# Patient Record
Sex: MALE | Race: WHITE | NOT HISPANIC OR LATINO | Employment: FULL TIME | URBAN - NONMETROPOLITAN AREA
[De-identification: names, ages, dates, MRNs, and addresses within clinical notes are randomized per-mention and may not be internally consistent; named-entity substitution may affect disease eponyms.]

---

## 2018-10-09 ENCOUNTER — OFFICE VISIT (OUTPATIENT)
Dept: URGENT CARE | Facility: PHYSICIAN GROUP | Age: 38
End: 2018-10-09
Payer: COMMERCIAL

## 2018-10-09 VITALS
DIASTOLIC BLOOD PRESSURE: 76 MMHG | BODY MASS INDEX: 30.07 KG/M2 | TEMPERATURE: 98.3 F | HEART RATE: 101 BPM | HEIGHT: 68 IN | OXYGEN SATURATION: 96 % | RESPIRATION RATE: 16 BRPM | SYSTOLIC BLOOD PRESSURE: 138 MMHG | WEIGHT: 198.4 LBS

## 2018-10-09 DIAGNOSIS — R51.9 ACUTE NONINTRACTABLE HEADACHE, UNSPECIFIED HEADACHE TYPE: ICD-10-CM

## 2018-10-09 PROCEDURE — 99204 OFFICE O/P NEW MOD 45 MIN: CPT | Performed by: FAMILY MEDICINE

## 2018-10-09 RX ORDER — BUTALBITAL, ACETAMINOPHEN AND CAFFEINE 50; 325; 40 MG/1; MG/1; MG/1
1 TABLET ORAL EVERY 6 HOURS PRN
Qty: 20 TAB | Refills: 0 | Status: SHIPPED | OUTPATIENT
Start: 2018-10-09 | End: 2018-10-16

## 2018-10-09 RX ORDER — KETOROLAC TROMETHAMINE 30 MG/ML
60 INJECTION, SOLUTION INTRAMUSCULAR; INTRAVENOUS ONCE
Status: COMPLETED | OUTPATIENT
Start: 2018-10-09 | End: 2018-10-09

## 2018-10-09 RX ADMIN — KETOROLAC TROMETHAMINE 60 MG: 30 INJECTION, SOLUTION INTRAMUSCULAR; INTRAVENOUS at 14:19

## 2018-10-09 NOTE — LETTER
October 9, 2018         Patient: Eric Ahn   YOB: 1980   Date of Visit: 10/9/2018           To Whom it May Concern:    Eric Ahn was seen in my clinic on 10/9/2018.     Please excuse from work for 10/9 and 10/10/18 due to medical condition.    If you have any questions or concerns, please don't hesitate to call.        Sincerely,           Eliud Buckner M.D.  Electronically Signed

## 2018-10-09 NOTE — PROGRESS NOTES
Chief Complaint:    Chief Complaint   Patient presents with   • Migraine     x5 days       History of Present Illness:    This is a new problem. For 5 days, he has headache, located bilaterally, and can be entire head. Problem fluctuates in severity but is overall at least moderately bothersome. Pain can feel sharp. Tried Aleve, Tylenol, and previously rx'd Vicodin, all with temporary help. He does not typically get headaches. No photophobia or nausea. He has occl thought he saw something out of corner of his eye, but no other changes in vision. Mom has h/o migraines along with a multitude of other problems. Recently he had body aches and cough but these are all improving and not bothersome to him anymore. No nasal symptoms or sore throat.      Review of Systems:    Constitutional: Negative for fever, chills, and diaphoresis.   Eyes: See HPI. Negative photophobia, pain, redness, and discharge.  ENT: Negative for ear pain, ear discharge, hearing loss, tinnitus, nasal congestion, nosebleeds, and sore throat.    Respiratory: See HPI.  Cardiovascular: Negative for chest pain, palpitations, orthopnea, claudication, leg swelling, and PND.   Gastrointestinal: Negative for abdominal pain, nausea, vomiting, diarrhea, constipation, blood in stool, and melena.   Genitourinary: Negative for dysuria, urinary urgency, urinary frequency, hematuria, and flank pain.   Musculoskeletal: See HPI.  Skin: Negative for rash and itching.   Neurological: See HPI.  Endo: Negative for polydipsia.   Heme: Does not bruise/bleed easily.   Psychiatric/Behavioral: Negative for depression, suicidal ideas, hallucinations, memory loss and substance abuse. The patient is not nervous/anxious and does not have insomnia.        Past Medical History:    History reviewed. No pertinent past medical history.    Past Surgical History:    History reviewed. No pertinent surgical history.    Social History:    Social History     Social History   • Marital  "status: Single     Spouse name: N/A   • Number of children: N/A   • Years of education: N/A     Occupational History   • Not on file.     Social History Main Topics   • Smoking status: Never Smoker   • Smokeless tobacco: Current User     Types: Chew   • Alcohol use No   • Drug use: No   • Sexual activity: Not on file     Other Topics Concern   • Not on file     Social History Narrative   • No narrative on file     Family History:    Family History   Problem Relation Age of Onset   • Lung Disease Mother      Medications:    No current outpatient prescriptions on file prior to visit.     No current facility-administered medications on file prior to visit.      Allergies:    No Known Allergies      Vitals:    Vitals:    10/09/18 1353   BP: 138/76   BP Location: Right arm   Patient Position: Sitting   BP Cuff Size: Adult   Pulse: (!) 101   Resp: 16   Temp: 36.8 °C (98.3 °F)   SpO2: 96%   Weight: 90 kg (198 lb 6.4 oz)   Height: 1.727 m (5' 8\")       Physical Exam:    Constitutional: Vital signs reviewed. Appears well-developed and well-nourished. No acute distress.   Eyes: Sclera white, conjunctivae clear. PERRLA.  ENT: External ears normal. External auditory canals normal without discharge. TMs translucent and non-bulging. Hearing normal. Nasal mucosa pink. Lips/teeth are normal. Oral mucosa pink and moist. Posterior pharynx: WNL.  Neck: Neck supple.   Cardiovascular: Regular rate and rhythm. No murmur.  Pulmonary/Chest: Respirations non-labored. Clear to auscultation bilaterally.   Musculoskeletal: Normal gait. Normal range of motion. No muscular atrophy or weakness.  Neurological: Alert and oriented to person, place, and time. CN 2-12 intact. Muscle tone normal. Coordination normal. Light touch and sensation normal. Normal cerebellar exam.  Skin: No rashes or lesions. Warm, dry, normal turgor.  Psychiatric: Normal mood and affect. Behavior is normal. Judgment and thought content normal.       Assessment / Plan:    1. " Acute nonintractable headache, unspecified headache type  - ketorolac (TORADOL) injection 60 mg; 2 mL by Intramuscular route Once.  - acetaminophen/caffeine/butalbital 325-40-50 mg (FIORICET) -40 MG Tab; Take 1 Tab by mouth every 6 hours as needed for Headache for up to 7 days.  Dispense: 20 Tab; Refill: 0      Work note given - excuse for 10/9 and 10/10/18.     Discussed with him DDX, management options, and risks, benefits, and alternatives to treatment plan agreed upon.    Exam is benign, he looks well. Recommended to treat headache with medications and see if will resolve problem. He agrees.    Agreeable to medications given and prescribed.  report checked - last Rx was Hydrocodone-APAP 5-325 mg #5 on 2/14/17.    Discussed expected course of duration, time for improvement, and to seek follow-up in Emergency Room, urgent care, or with PCP if getting worse at any time or not improving within expected time frame.

## 2018-10-25 ENCOUNTER — HOSPITAL ENCOUNTER (OUTPATIENT)
Facility: MEDICAL CENTER | Age: 38
End: 2018-10-25
Attending: UROLOGY
Payer: COMMERCIAL

## 2018-10-25 ENCOUNTER — HOSPITAL ENCOUNTER (OUTPATIENT)
Dept: LAB | Facility: MEDICAL CENTER | Age: 38
End: 2018-10-25
Attending: UROLOGY
Payer: COMMERCIAL

## 2018-10-25 PROCEDURE — 89321 SEMEN ANAL SPERM DETECTION: CPT

## 2018-10-27 LAB
SPECIMEN VOL SMN: 1.7 ML
SPERM P VAS SMN QL MICRO: NORMAL

## 2019-01-17 ENCOUNTER — OFFICE VISIT (OUTPATIENT)
Dept: URGENT CARE | Facility: PHYSICIAN GROUP | Age: 39
End: 2019-01-17
Payer: COMMERCIAL

## 2019-01-17 VITALS
HEART RATE: 96 BPM | BODY MASS INDEX: 30.01 KG/M2 | RESPIRATION RATE: 16 BRPM | TEMPERATURE: 98.3 F | HEIGHT: 68 IN | DIASTOLIC BLOOD PRESSURE: 74 MMHG | WEIGHT: 198 LBS | OXYGEN SATURATION: 96 % | SYSTOLIC BLOOD PRESSURE: 116 MMHG

## 2019-01-17 DIAGNOSIS — H10.33 ACUTE CONJUNCTIVITIS OF BOTH EYES, UNSPECIFIED ACUTE CONJUNCTIVITIS TYPE: ICD-10-CM

## 2019-01-17 DIAGNOSIS — J06.9 URI WITH COUGH AND CONGESTION: ICD-10-CM

## 2019-01-17 PROCEDURE — 99214 OFFICE O/P EST MOD 30 MIN: CPT | Performed by: PHYSICIAN ASSISTANT

## 2019-01-17 RX ORDER — DOXYCYCLINE HYCLATE 100 MG
100 TABLET ORAL 2 TIMES DAILY
Qty: 20 TAB | Refills: 0 | Status: SHIPPED | OUTPATIENT
Start: 2019-01-17 | End: 2020-12-15

## 2019-01-17 RX ORDER — POLYMYXIN B SULFATE AND TRIMETHOPRIM 1; 10000 MG/ML; [USP'U]/ML
1 SOLUTION OPHTHALMIC EVERY 4 HOURS
Qty: 10 ML | Refills: 0 | Status: SHIPPED | OUTPATIENT
Start: 2019-01-17 | End: 2019-01-24

## 2019-01-17 NOTE — PROGRESS NOTES
Chief Complaint   Patient presents with   • Cough       HISTORY OF PRESENT ILLNESS: Patient is a 38 y.o. male who presents today for the following:    Cough x 2-3 weeks, white sputum production  + nasal congestion, red eyes/exudate, ST, ear pain  Denies SOB, fever, body aches, chills  OTC meds tried: mucinex - didn't help much; allergy meds     There are no active problems to display for this patient.      Allergies:Patient has no known allergies.    Current Outpatient Prescriptions Ordered in [facility]   Medication Sig Dispense Refill   • doxycycline (VIBRAMYCIN) 100 MG Tab Take 1 Tab by mouth 2 times a day. 20 Tab 0   • polymixin-trimethoprim (POLYTRIM) 77939-4.1 UNIT/ML-% Solution Place 1 Drop in both eyes every 4 hours for 7 days. 10 mL 0     No current Epic-ordered facility-administered medications on file.        No past medical history on file.    Social History   Substance Use Topics   • Smoking status: Never Smoker   • Smokeless tobacco: Current User     Types: Chew   • Alcohol use No       Family Status   Relation Status   • Mo Alive   • Fa Alive   • Bro Alive     Family History   Problem Relation Age of Onset   • Lung Disease Mother        Review of Systems:   Constitutional ROS: No unexpected change in weight, No weakness, No fatigue  Ear ROS: No drainage, No tinnitus or vertigo, No recent change in hearing  Mouth/Throat ROS: No teeth or gum problems, No bleeding gums, No tongue complaints  Neck ROS: No swollen glands, No significant pain in neck  Pulmonary ROS: No chronic cough, sputum, or hemoptysis, No dyspnea on exertion, No wheezing  Cardiovascular ROS: No diaphoresis, No edema, No palpitations  Gastrointestinal ROS: No change in bowel habits, No significant change in appetite, No nausea, vomiting, diarrhea, or constipation  Musculoskeletal/Extremities ROS: No peripheral edema, No pain, redness or swelling on the joints  Hematologic/Lymphatic ROS: No chills, No night sweats, No weight  "loss  Skin/Integumentary ROS: No edema, No evidence of rash, No itching      Exam:  Blood pressure 116/74, pulse 96, temperature 36.8 °C (98.3 °F), temperature source Temporal, resp. rate 16, height 1.727 m (5' 8\"), weight 89.8 kg (198 lb), SpO2 96 %.  General: Well developed, well nourished. No distress.  Eye: PERRL/EOMI; conjunctivae mildly injected bilaterally, lids normal.  ENMT: Lips without lesions, MMM. Oropharynx is clear. Bilateral TMs are within normal limits.  Pulmonary: Unlabored respiratory effort. Lungs clear to auscultation, no wheezes, no rhonchi.  Cardiovascular: Regular rate and rhythm without murmur.    Neurologic: Grossly nonfocal. No facial asymmetry noted.  Lymph: No cervical lymphadenopathy noted.  Skin: Warm, dry, good turgor. No rashes in visible areas.   Psych: Normal mood. Alert and oriented x3. Judgment and insight is normal.    Assessment/Plan:  Take all medication as directed. Discussed appropriate over-the-counter symptomatic medication, and when to return to clinic. Follow up for worsening or persistent symptoms.  1. URI with cough and congestion  doxycycline (VIBRAMYCIN) 100 MG Tab   2. Acute conjunctivitis of both eyes, unspecified acute conjunctivitis type  polymixin-trimethoprim (POLYTRIM) 59319-7.1 UNIT/ML-% Solution       "

## 2020-01-10 ENCOUNTER — OFFICE VISIT (OUTPATIENT)
Dept: URGENT CARE | Facility: PHYSICIAN GROUP | Age: 40
End: 2020-01-10
Payer: COMMERCIAL

## 2020-01-10 VITALS
TEMPERATURE: 97.7 F | WEIGHT: 214 LBS | DIASTOLIC BLOOD PRESSURE: 84 MMHG | BODY MASS INDEX: 32.43 KG/M2 | SYSTOLIC BLOOD PRESSURE: 118 MMHG | HEART RATE: 100 BPM | HEIGHT: 68 IN | RESPIRATION RATE: 14 BRPM | OXYGEN SATURATION: 95 %

## 2020-01-10 DIAGNOSIS — B34.9 ACUTE VIRAL SYNDROME: ICD-10-CM

## 2020-01-10 DIAGNOSIS — J02.0 STREP PHARYNGITIS: ICD-10-CM

## 2020-01-10 LAB
FLUAV+FLUBV AG SPEC QL IA: NEGATIVE
INT CON NEG: NEGATIVE
INT CON NEG: NEGATIVE
INT CON POS: POSITIVE
INT CON POS: POSITIVE
S PYO AG THROAT QL: POSITIVE

## 2020-01-10 PROCEDURE — 87804 INFLUENZA ASSAY W/OPTIC: CPT | Performed by: PHYSICIAN ASSISTANT

## 2020-01-10 PROCEDURE — 87880 STREP A ASSAY W/OPTIC: CPT | Performed by: PHYSICIAN ASSISTANT

## 2020-01-10 PROCEDURE — 99214 OFFICE O/P EST MOD 30 MIN: CPT | Performed by: PHYSICIAN ASSISTANT

## 2020-01-10 RX ORDER — AMOXICILLIN 500 MG/1
500 CAPSULE ORAL 2 TIMES DAILY
Qty: 20 CAP | Refills: 0 | Status: SHIPPED | OUTPATIENT
Start: 2020-01-10 | End: 2020-01-20

## 2020-01-10 ASSESSMENT — ENCOUNTER SYMPTOMS
SHORTNESS OF BREATH: 0
SPUTUM PRODUCTION: 0
SINUS PAIN: 0
CHILLS: 1
MYALGIAS: 1
SORE THROAT: 1
COUGH: 1
HEADACHES: 1
FEVER: 1

## 2020-01-10 NOTE — PROGRESS NOTES
"Subjective:   Eric Ahn  is a 39 y.o. male who presents for Pharyngitis    This is a new problem.  Patient presents to urgent care with 3-day history of fever, chills, generalized body aches, nasal congestion, sore throat and cough.  Patient reports sore throat is \"severe\" and is making it difficult for him to sleep because he will \"stop breathing\".  Patient has not been taking any medication for this problem.  Patient has had no known exposure to strep or flu.  Patient reports that he did receive influenza vaccine this season.    Pharyngitis    Associated symptoms include congestion, coughing and headaches. Pertinent negatives include no ear pain or shortness of breath.     Review of Systems   Constitutional: Positive for chills, fever and malaise/fatigue.   HENT: Positive for congestion and sore throat. Negative for ear pain and sinus pain.    Respiratory: Positive for cough. Negative for sputum production and shortness of breath.    Musculoskeletal: Positive for myalgias.   Neurological: Positive for headaches.   All other systems reviewed and are negative.    No Known Allergies  Reviewed past medical, surgical , social and family history.  Reviewed prescription and over-the-counter medications with patient and electronic health record today.     Objective:   /84 (BP Location: Right arm, Patient Position: Sitting, BP Cuff Size: Large adult)   Pulse 100   Temp 36.5 °C (97.7 °F) (Temporal)   Resp 14   Ht 1.727 m (5' 8\")   Wt 97.1 kg (214 lb)   SpO2 95%   BMI 32.54 kg/m²   Physical Exam  Vitals signs reviewed.   Constitutional:       Appearance: He is well-developed. He is not ill-appearing or toxic-appearing.   HENT:      Head: Normocephalic and atraumatic.      Right Ear: Tympanic membrane, ear canal and external ear normal.      Left Ear: Tympanic membrane, ear canal and external ear normal.      Nose: Mucosal edema and congestion present. No rhinorrhea.      Right Turbinates: Swollen.      " Left Turbinates: Swollen.      Right Sinus: No maxillary sinus tenderness or frontal sinus tenderness.      Left Sinus: No maxillary sinus tenderness or frontal sinus tenderness.      Mouth/Throat:      Lips: Pink.      Mouth: Mucous membranes are moist.      Pharynx: Uvula midline. Posterior oropharyngeal erythema present. No oropharyngeal exudate or uvula swelling.      Tonsils: Swellin on the right. 0 on the left.   Eyes:      Conjunctiva/sclera: Conjunctivae normal.      Pupils: Pupils are equal, round, and reactive to light.   Neck:      Musculoskeletal: Normal range of motion and neck supple.   Cardiovascular:      Rate and Rhythm: Normal rate and regular rhythm.      Heart sounds: Normal heart sounds. No murmur. No friction rub.   Pulmonary:      Effort: Pulmonary effort is normal. No respiratory distress.      Breath sounds: Normal breath sounds.   Abdominal:      General: Bowel sounds are normal.      Palpations: Abdomen is soft.      Tenderness: There is no tenderness.   Musculoskeletal: Normal range of motion.   Lymphadenopathy:      Head:      Right side of head: No submental, submandibular, tonsillar, preauricular or posterior auricular adenopathy.      Left side of head: No submental, submandibular, tonsillar, preauricular or posterior auricular adenopathy.      Cervical: No cervical adenopathy.      Upper Body:      Right upper body: No supraclavicular adenopathy.      Left upper body: No supraclavicular adenopathy.   Skin:     General: Skin is warm and dry.      Findings: No rash.   Neurological:      Mental Status: He is alert and oriented to person, place, and time.      Cranial Nerves: Cranial nerves are intact. No cranial nerve deficit.      Sensory: Sensation is intact. No sensory deficit.      Motor: Motor function is intact.      Coordination: Coordination is intact. Coordination normal.      Gait: Gait is intact.   Psychiatric:         Attention and Perception: Attention normal.          Mood and Affect: Mood normal.         Speech: Speech normal.         Behavior: Behavior normal.         Thought Content: Thought content normal.         Judgment: Judgment normal.           Assessment/Plan:   1. Strep pharyngitis  - POCT Rapid Strep A  - amoxicillin (AMOXIL) 500 MG Cap; Take 1 Cap by mouth 2 times a day for 10 days.  Dispense: 20 Cap; Refill: 0    2. Acute viral syndrome  - POCT Influenza A/B    Results for orders placed or performed in visit on 01/10/20   POCT Influenza A/B   Result Value Ref Range    Rapid Influenza A-B Negative     Internal Control Positive Positive     Internal Control Negative Negative    POCT Rapid Strep A   Result Value Ref Range    Rapid Strep Screen Positive     Internal Control Positive Positive     Internal Control Negative Negative        Patient be treated with amoxicillin as above.  Contagion reviewed.  Increase fluids, rest.  Patient may use salt water gargles, ice pops, cool fluids.    May use Tylenol or ibuprofen over-the-counter as needed for pain or fever not to exceed recommended daily dose.  Work note provided.  Nasal saline  Humidifer  OTC Flonase nasal spray PRN    Differential diagnosis, natural history, supportive care, and indications for immediate follow-up discussed.     Red flag warning symptoms and strict ER/follow-up precautions given.  The patient demonstrated a good understanding and agreed with the treatment plan.  Please note that this note was created using voice recognition speech to text software. Every effort has been made to correct obvious errors.  However, I expect there are errors of grammar and possibly context that were not discovered prior to finalizing the note  SHAYAN Mg PA-C

## 2020-01-10 NOTE — LETTER
January 10, 2020         Patient: Eric Ahn   YOB: 1980   Date of Visit: 1/10/2020           To Whom it May Concern:    Eric Ahn was seen in my clinic on 1/10/2020. He may return to work on 11/13/2020..    If you have any questions or concerns, please don't hesitate to call.        Sincerely,           Jennifer Mg P.A.-C.  Electronically Signed

## 2020-02-24 ENCOUNTER — OFFICE VISIT (OUTPATIENT)
Dept: URGENT CARE | Facility: PHYSICIAN GROUP | Age: 40
End: 2020-02-24
Payer: COMMERCIAL

## 2020-02-24 VITALS
RESPIRATION RATE: 16 BRPM | WEIGHT: 210 LBS | OXYGEN SATURATION: 96 % | TEMPERATURE: 98.4 F | HEART RATE: 90 BPM | SYSTOLIC BLOOD PRESSURE: 110 MMHG | DIASTOLIC BLOOD PRESSURE: 80 MMHG | BODY MASS INDEX: 31.83 KG/M2 | HEIGHT: 68 IN

## 2020-02-24 DIAGNOSIS — J02.9 SORE THROAT: ICD-10-CM

## 2020-02-24 DIAGNOSIS — J02.0 STREP PHARYNGITIS: Primary | ICD-10-CM

## 2020-02-24 LAB
INT CON NEG: NEGATIVE
INT CON POS: POSITIVE
S PYO AG THROAT QL: POSITIVE

## 2020-02-24 PROCEDURE — 87880 STREP A ASSAY W/OPTIC: CPT | Performed by: PHYSICIAN ASSISTANT

## 2020-02-24 PROCEDURE — 99214 OFFICE O/P EST MOD 30 MIN: CPT | Performed by: PHYSICIAN ASSISTANT

## 2020-02-24 RX ORDER — AMOXICILLIN 875 MG/1
875 TABLET, COATED ORAL 2 TIMES DAILY
Qty: 20 TAB | Refills: 0 | Status: SHIPPED | OUTPATIENT
Start: 2020-02-24 | End: 2020-03-05

## 2020-02-25 NOTE — PROGRESS NOTES
Chief Complaint   Patient presents with   • Pharyngitis       HISTORY OF PRESENT ILLNESS: Patient is a 40 y.o. male who presents today because he has a 3-day history of sore throat and swollen lymph nodes.  Denies any fevers, chills, nausea, vomiting or diarrhea.  Incidentally he was put on doxycycline about a month ago for strep and his symptoms did go away but they returned over the last 3 days.  He currently is not on any medication for his current symptoms.    There are no active problems to display for this patient.      Allergies:Patient has no known allergies.    Current Outpatient Medications Ordered in Epic   Medication Sig Dispense Refill   • amoxicillin (AMOXIL) 875 MG tablet Take 1 Tab by mouth 2 times a day for 10 days. 20 Tab 0   • doxycycline (VIBRAMYCIN) 100 MG Tab Take 1 Tab by mouth 2 times a day. 20 Tab 0     No current Epic-ordered facility-administered medications on file.        History reviewed. No pertinent past medical history.    Social History     Tobacco Use   • Smoking status: Never Smoker   • Smokeless tobacco: Current User     Types: Chew   Substance Use Topics   • Alcohol use: No   • Drug use: No       Family Status   Relation Name Status   • Mo  Alive   • Fa  Alive   • Bro  Alive     Family History   Problem Relation Age of Onset   • Lung Disease Mother        ROS:  Review of Systems   Constitutional: Negative for fever, chills, weight loss and malaise/fatigue.   HENT: Negative for ear pain, nosebleeds, congestion, positive for sore throat and neck pain.    Eyes: Negative for blurred vision.   Respiratory: Negative for cough, sputum production, shortness of breath and wheezing.    Cardiovascular: Negative for chest pain, palpitations, orthopnea and leg swelling.   Gastrointestinal: Negative for heartburn, nausea, vomiting and abdominal pain.   Genitourinary: Negative for dysuria, urgency and frequency.     Exam:  /80 (BP Location: Right arm, Patient Position: Sitting)   Pulse  "90   Temp 36.9 °C (98.4 °F) (Temporal)   Resp 16   Ht 1.727 m (5' 8\")   Wt 95.3 kg (210 lb)   SpO2 96%   General:  Well nourished, well developed male in NAD  Head:Normocephalic, atraumatic  Eyes: PERRLA, EOM within normal limits, no conjunctival injection, no scleral icterus, visual fields and acuity grossly intact.  Ears: Normal shape and symmetry, no tenderness, no discharge. External canals are without any significant edema or erythema. Tympanic membranes are without any inflammation, no effusion. Gross auditory acuity is intact  Nose: Symmetrical without tenderness, no discharge.  Mouth: reasonable hygiene, there is pharyngeal arch erythema without exudates or tonsillar enlargement.  Neck: He has bilateral anterior cervical lymph node enlargement and tenderness, range of motion within normal limits, no tracheal deviation. No obvious thyroid enlargement.  Pulmonary: chest is symmetrical with respiration, no wheezes, crackles, or rhonchi.  Cardiovascular: regular rate and rhythm without murmurs, rubs, or gallops.  Extremities: no clubbing, cyanosis, or edema.    Strep test is positive    Please note that this dictation was created using voice recognition software. I have made every reasonable attempt to correct obvious errors, but I expect that there are errors of grammar and possibly content that I did not discover before finalizing the note.    Assessment/Plan:  1. Strep pharyngitis  amoxicillin (AMOXIL) 875 MG tablet   2. Sore throat  POCT Rapid Strep A   Tylenol or ibuprofen as tolerated    Followup with primary care in the next 7-10 days if not significantly improving, return to the urgent care or go to the emergency room sooner for any worsening of symptoms.       "

## 2020-12-15 ENCOUNTER — OFFICE VISIT (OUTPATIENT)
Dept: URGENT CARE | Facility: PHYSICIAN GROUP | Age: 40
End: 2020-12-15
Payer: COMMERCIAL

## 2020-12-15 VITALS
SYSTOLIC BLOOD PRESSURE: 124 MMHG | DIASTOLIC BLOOD PRESSURE: 92 MMHG | BODY MASS INDEX: 31.98 KG/M2 | HEIGHT: 68 IN | WEIGHT: 211 LBS | OXYGEN SATURATION: 94 % | TEMPERATURE: 99.2 F | HEART RATE: 116 BPM | RESPIRATION RATE: 18 BRPM

## 2020-12-15 DIAGNOSIS — M75.41 IMPINGEMENT SYNDROME OF RIGHT SHOULDER: Primary | ICD-10-CM

## 2020-12-15 PROCEDURE — 99214 OFFICE O/P EST MOD 30 MIN: CPT | Performed by: PHYSICIAN ASSISTANT

## 2020-12-15 RX ORDER — CYCLOBENZAPRINE HCL 5 MG
5-10 TABLET ORAL 3 TIMES DAILY PRN
Qty: 30 TAB | Refills: 0 | Status: SHIPPED | OUTPATIENT
Start: 2020-12-15 | End: 2024-03-01

## 2020-12-15 RX ORDER — METHYLPREDNISOLONE 4 MG/1
TABLET ORAL
Qty: 21 TAB | Refills: 0 | Status: SHIPPED | OUTPATIENT
Start: 2020-12-15 | End: 2024-03-01

## 2020-12-15 RX ORDER — DICLOFENAC SODIUM 75 MG/1
75 TABLET, DELAYED RELEASE ORAL 2 TIMES DAILY
Qty: 20 TAB | Refills: 0 | Status: SHIPPED | OUTPATIENT
Start: 2020-12-15 | End: 2020-12-25

## 2020-12-15 RX ORDER — METHYLPREDNISOLONE 4 MG/1
TABLET ORAL
Qty: 21 TAB | Refills: 0 | Status: SHIPPED | OUTPATIENT
Start: 2020-12-15 | End: 2020-12-15

## 2020-12-15 NOTE — LETTER
December 15, 2020       Patient: Eric Ahn   YOB: 1980   Date of Visit: 12/15/2020         To Whom It May Concern:    In my medical opinion, I recommend that Eric Ahn may be excused from work for the dates of 12/15/20-12/16/20.      If you have any questions or concerns, please don't hesitate to call [phone]          Sincerely,          Warren Marinelli P.A.-C.  Electronically Signed

## 2020-12-16 NOTE — PROGRESS NOTES
Subjective:      Pt is a 40 y.o. male who presents with Arm Pain (R arm)            HPI  This is a new problem. Pt notes may have slept wrong on right arm but notes right muscular shoulder pain x 3-4 days and notes OTC meds are not helping. Pt has not taken any Rx medications for this condition. Pt states the pain is a 7/10, aching in nature and worse at night. Pt denies CP, SOB, NVD, paresthesias, headaches, dizziness, change in vision, hives, or other joint pain. The pt's medication list, problem list, and allergies have been evaluated and reviewed during today's visit.    PMH:  Negative per pt.      PSH:  Negative per pt.      Fam Hx:    family history includes Lung Disease in his mother.  Family Status   Relation Name Status   • Mo  Alive   • Fa  Alive   • Bro  Alive       Soc HX:  Social History     Socioeconomic History   • Marital status: Single     Spouse name: Not on file   • Number of children: Not on file   • Years of education: Not on file   • Highest education level: Not on file   Occupational History   • Not on file   Social Needs   • Financial resource strain: Not on file   • Food insecurity     Worry: Not on file     Inability: Not on file   • Transportation needs     Medical: Not on file     Non-medical: Not on file   Tobacco Use   • Smoking status: Never Smoker   • Smokeless tobacco: Current User     Types: Chew   Substance and Sexual Activity   • Alcohol use: No   • Drug use: No   • Sexual activity: Not on file   Lifestyle   • Physical activity     Days per week: Not on file     Minutes per session: Not on file   • Stress: Not on file   Relationships   • Social connections     Talks on phone: Not on file     Gets together: Not on file     Attends Restoration service: Not on file     Active member of club or organization: Not on file     Attends meetings of clubs or organizations: Not on file     Relationship status: Not on file   • Intimate partner violence     Fear of current or ex partner: Not on  "file     Emotionally abused: Not on file     Physically abused: Not on file     Forced sexual activity: Not on file   Other Topics Concern   • Not on file   Social History Narrative   • Not on file         Medications:    Current Outpatient Medications:   •  cyclobenzaprine (FLEXERIL) 5 mg tablet, Take 1-2 Tabs by mouth 3 times a day as needed., Disp: 30 Tab, Rfl: 0  •  diclofenac DR (VOLTAREN) 75 MG Tablet Delayed Response, Take 1 Tab by mouth 2 times a day for 10 days., Disp: 20 Tab, Rfl: 0  •  methylPREDNISolone (MEDROL DOSEPAK) 4 MG Tablet Therapy Pack, Follow schedule on package instructions., Disp: 21 Tab, Rfl: 0      Allergies:  Patient has no known allergies.    ROS  Constitutional: Negative for fever, chills and malaise/fatigue.   HENT: Negative for congestion and sore throat.    Eyes: Negative for blurred vision, double vision and photophobia.   Respiratory: Negative for cough and shortness of breath.  Cardiovascular: Negative for chest pain and palpitations.   Gastrointestinal: Negative for heartburn, nausea, vomiting, abdominal pain, diarrhea and constipation.   Genitourinary: Negative for dysuria and flank pain.   Musculoskeletal: +right arm joint pain and myalgias.   Skin: Negative for itching and rash.   Neurological: Negative for dizziness, tingling and headaches.   Endo/Heme/Allergies: Does not bruise/bleed easily.   Psychiatric/Behavioral: Negative for depression. The patient is not nervous/anxious.           Objective:     /92 (BP Location: Left arm, Patient Position: Sitting, BP Cuff Size: Large adult)   Pulse (!) 116   Temp 37.3 °C (99.2 °F) (Temporal)   Resp 18   Ht 1.727 m (5' 8\")   Wt 95.7 kg (211 lb)   SpO2 94%   BMI 32.08 kg/m²      Physical Exam  Musculoskeletal:      Right shoulder: He exhibits decreased range of motion, tenderness, bony tenderness, pain and spasm. He exhibits no swelling, no effusion, no crepitus, no deformity, no laceration, normal pulse and normal " strength.        Arms:             Constitutional: PT is oriented to person, place, and time. PT appears well-developed and well-nourished. No distress.   HENT:   Head: Normocephalic and atraumatic.   Mouth/Throat: Oropharynx is clear and moist. No oropharyngeal exudate.   Eyes: Conjunctivae normal and EOM are normal. Pupils are equal, round, and reactive to light.   Neck: Normal range of motion. Neck supple. No thyromegaly present.   Cardiovascular: Normal rate, regular rhythm, normal heart sounds and intact distal pulses.  Exam reveals no gallop and no friction rub.    No murmur heard.  Pulmonary/Chest: Effort normal and breath sounds normal. No respiratory distress. PT has no wheezes. PT has no rales. Pt exhibits no tenderness.   Abdominal: Soft. Bowel sounds are normal. PT exhibits no distension and no mass. There is no tenderness. There is no rebound and no guarding.   Neurological: PT is alert and oriented to person, place, and time. PT has normal reflexes. No cranial nerve deficit.   Skin: Skin is warm and dry. No rash noted. PT is not diaphoretic. No erythema.       Psychiatric: PT has a normal mood and affect. PT behavior is normal. Judgment and thought content normal.        Assessment/Plan:        1. Impingement syndrome of right shoulder    - cyclobenzaprine (FLEXERIL) 5 mg tablet; Take 1-2 Tabs by mouth 3 times a day as needed.  Dispense: 30 Tab; Refill: 0  - diclofenac DR (VOLTAREN) 75 MG Tablet Delayed Response; Take 1 Tab by mouth 2 times a day for 10 days.  Dispense: 20 Tab; Refill: 0  - methylPREDNISolone (MEDROL DOSEPAK) 4 MG Tablet Therapy Pack; Follow schedule on package instructions.  Dispense: 21 Tab; Refill: 0      RICE therapy discussed  Gentle ROM exercises discussed  WBAT RUE  Ice/heat therapy discussed  Rest, fluids encouraged.  AVS with medical info given.  Pt was in full understanding and agreement with the plan.  Follow-up as needed if symptoms worsen or fail to improve.

## 2020-12-16 NOTE — PATIENT INSTRUCTIONS
Shoulder Impingement Syndrome    Shoulder impingement syndrome is a condition that causes pain when connective tissues (tendons) surrounding the shoulder joint become pinched. These tendons are part of the group of muscles and tissues that help to stabilize the shoulder (rotator cuff). Beneath the rotator cuff is a fluid-filled sac (bursa) that allows the muscles and tendons to glide smoothly.  The bursa may become swollen or irritated (bursitis). Bursitis, swelling in the rotator cuff tendons, or both conditions can decrease how much space is under a bone in the shoulder joint (acromion), resulting in impingement.  What are the causes?  Shoulder impingement syndrome may be caused by bursitis or swelling of the rotator cuff tendons, which may result from:  · Repetitive overhead arm movements.  · Falling onto the shoulder.  · Weakness in the shoulder muscles.  What increases the risk?  You may be more likely to develop this condition if you:  · Play sports that involve throwing, such as baseball.  · Participate in sports such as tennis, volleyball, and swimming.  · Work as a , nguyen, or .  Some people are also more likely to develop impingement syndrome because of the shape of their acromion bone.  What are the signs or symptoms?  The main symptom of this condition is pain on the front or side of the shoulder. The pain may:  · Get worse when lifting or raising the arm.  · Get worse at night.  · Wake you up from sleeping.  · Feel sharp when the shoulder is moved and then fade to an ache.  Other symptoms may include:  · Tenderness.  · Stiffness.  · Inability to raise the arm above shoulder level or behind the body.  · Weakness.  How is this diagnosed?  This condition may be diagnosed based on:  · Your symptoms and medical history.  · A physical exam.  · Imaging tests, such as:  ? X-rays.  ? MRI.  ? Ultrasound.  How is this treated?  This condition may be treated by:  · Resting your shoulder and  avoiding all activities that cause pain or put stress on the shoulder.  · Icing your shoulder.  · NSAIDs to help reduce pain and swelling.  · One or more injections of medicines to numb the area and reduce inflammation.  · Physical therapy.  · Surgery. This may be needed if nonsurgical treatments have not helped. Surgery may involve repairing the rotator cuff, reshaping the acromion, or removing the bursa.  Follow these instructions at home:  Managing pain, stiffness, and swelling    · If directed, put ice on the injured area.  ? Put ice in a plastic bag.  ? Place a towel between your skin and the bag.  ? Leave the ice on for 20 minutes, 2-3 times a day.  Activity  · Rest and return to your normal activities as told by your health care provider. Ask your health care provider what activities are safe for you.  · Do exercises as told by your health care provider.  General instructions  · Do not use any products that contain nicotine or tobacco, such as cigarettes, e-cigarettes, and chewing tobacco. These can delay healing. If you need help quitting, ask your health care provider.  · Ask your health care provider when it is safe for you to drive.  · Take over-the-counter and prescription medicines only as told by your health care provider.  · Keep all follow-up visits as told by your health care provider. This is important.  How is this prevented?  · Give your body time to rest between periods of activity.  · Be safe and responsible while being active. This will help you avoid falls.  · Maintain physical fitness, including strength and flexibility.  Contact a health care provider if:  · Your symptoms have not improved after 1-2 months of treatment and rest.  · You cannot lift your arm away from your body.  Summary  · Shoulder impingement syndrome is a condition that causes pain when connective tissues (tendons) surrounding the shoulder joint become pinched.  · The main symptom of this condition is pain on the front or  side of the shoulder.  · This condition is usually treated with rest, ice, and pain medicines as needed.  This information is not intended to replace advice given to you by your health care provider. Make sure you discuss any questions you have with your health care provider.  Document Released: 12/18/2006 Document Revised: 04/10/2020 Document Reviewed: 06/12/2019  Elsevier Patient Education © 2020 [employer]

## 2021-03-19 ENCOUNTER — OFFICE VISIT (OUTPATIENT)
Dept: URGENT CARE | Facility: PHYSICIAN GROUP | Age: 41
End: 2021-03-19
Payer: COMMERCIAL

## 2021-03-19 VITALS
TEMPERATURE: 98.1 F | WEIGHT: 200 LBS | HEIGHT: 68 IN | HEART RATE: 92 BPM | RESPIRATION RATE: 14 BRPM | SYSTOLIC BLOOD PRESSURE: 128 MMHG | OXYGEN SATURATION: 98 % | BODY MASS INDEX: 30.31 KG/M2 | DIASTOLIC BLOOD PRESSURE: 72 MMHG

## 2021-03-19 DIAGNOSIS — R19.7 DIARRHEA, UNSPECIFIED TYPE: ICD-10-CM

## 2021-03-19 DIAGNOSIS — K29.00 ACUTE GASTRITIS WITHOUT HEMORRHAGE, UNSPECIFIED GASTRITIS TYPE: ICD-10-CM

## 2021-03-19 PROCEDURE — 99214 OFFICE O/P EST MOD 30 MIN: CPT | Performed by: PHYSICIAN ASSISTANT

## 2021-03-19 RX ORDER — DIPHENOXYLATE HYDROCHLORIDE AND ATROPINE SULFATE 2.5; .025 MG/1; MG/1
1 TABLET ORAL 4 TIMES DAILY PRN
Qty: 20 TABLET | Refills: 0 | Status: SHIPPED | OUTPATIENT
Start: 2021-03-19 | End: 2021-03-24

## 2021-03-19 RX ORDER — OMEPRAZOLE 20 MG/1
20 CAPSULE, DELAYED RELEASE ORAL DAILY
Qty: 30 CAPSULE | Refills: 0 | Status: SHIPPED | OUTPATIENT
Start: 2021-03-19 | End: 2024-03-01

## 2021-03-19 NOTE — PROGRESS NOTES
Chief Complaint   Patient presents with   • Diarrhea     x 1 week last, stomach pain        HISTORY OF PRESENT ILLNESS: Patient is a 41 y.o. male who presents today because a history of diarrhea.  Other members of his family have had similar symptoms but they are getting over there is.  He reports watery and brown diarrhea without any blood or mucus in it up to every 1-2 hours, sometimes more frequently.  He has midepigastric discomfort and an acidic sensation as well.  Denies any recent fevers, chills, vomiting.    There are no problems to display for this patient.      Allergies:Patient has no known allergies.    Current Outpatient Medications Ordered in Epic   Medication Sig Dispense Refill   • omeprazole (PRILOSEC) 20 MG delayed-release capsule Take 1 capsule by mouth every day. 30 capsule 0   • diphenoxylate-atropine (LOMOTIL) 2.5-0.025 MG Tab Take 1 tablet by mouth 4 times a day as needed for Diarrhea for up to 5 days. 20 tablet 0   • cyclobenzaprine (FLEXERIL) 5 mg tablet Take 1-2 Tabs by mouth 3 times a day as needed. 30 Tab 0   • methylPREDNISolone (MEDROL DOSEPAK) 4 MG Tablet Therapy Pack Follow schedule on package instructions. (Patient not taking: Reported on 3/19/2021) 21 Tab 0     No current Epic-ordered facility-administered medications on file.       History reviewed. No pertinent past medical history.    Social History     Tobacco Use   • Smoking status: Never Smoker   • Smokeless tobacco: Current User     Types: Chew   Substance Use Topics   • Alcohol use: No   • Drug use: No       Family Status   Relation Name Status   • Mo  Alive   • Fa  Alive   • Bro  Alive     Family History   Problem Relation Age of Onset   • Lung Disease Mother        ROS:  Review of Systems   Constitutional: Negative for fever, chills, weight loss and malaise/fatigue.   HENT: Negative for ear pain, nosebleeds, congestion, sore throat and neck pain.    Eyes: Negative for blurred vision.   Respiratory: Negative for cough,  "sputum production, shortness of breath and wheezing.    Cardiovascular: Negative for chest pain, palpitations, orthopnea and leg swelling.   Gastrointestinal: Positive for heartburn, nausea, no vomiting and and mild generalized abdominal pain.  Positive for diarrhea  Genitourinary: Negative for dysuria, urgency and frequency.     Exam:  /72   Pulse 92   Temp 36.7 °C (98.1 °F) (Temporal)   Resp 14   Ht 1.727 m (5' 8\")   Wt 90.7 kg (200 lb)   SpO2 98%   General:  Well nourished, well developed male in NAD  Head:Normocephalic, atraumatic  Eyes: PERRLA, EOM within normal limits, no conjunctival injection, no scleral icterus, visual fields and acuity grossly intact.  Nose: Symmetrical without tenderness, no discharge.  Mouth: reasonable hygiene, no erythema exudates or tonsillar enlargement.  Neck: no masses, range of motion within normal limits, no tracheal deviation. No obvious thyroid enlargement.  Abdomen: Minimal distention, bowel tones in all 4 quadrants, soft, no tenderness to palpation, no organomegaly, no rebound referred rebound tenderness, no Perez's or McBurney's point tenderness   extremities: no clubbing, cyanosis, or edema.    Please note that this dictation was created using voice recognition software. I have made every reasonable attempt to correct obvious errors, but I expect that there are errors of grammar and possibly content that I did not discover before finalizing the note.    Assessment/Plan:  1. Diarrhea, unspecified type  diphenoxylate-atropine (LOMOTIL) 2.5-0.025 MG Tab   2. Acute gastritis without hemorrhage, unspecified gastritis type  omeprazole (PRILOSEC) 20 MG delayed-release capsule   Discussed bland diet    Followup with primary care in the next 7-10 days if not significantly improving, return to the urgent care or go to the emergency room sooner for any worsening of symptoms.       "

## 2021-03-19 NOTE — LETTER
March 19, 2021         Patient: Eric Ahn   YOB: 1980   Date of Visit: 3/19/2021           To Whom it May Concern:    Eric Ahn was seen in my clinic on 3/19/2021.  Please excuse his absence from work for 3/19, 3/20 and any recent absences.      If you have any questions or concerns, please don't hesitate to call.        Sincerely,           Warren Kay P.A.-C.  Electronically Signed

## 2021-10-20 ENCOUNTER — OFFICE VISIT (OUTPATIENT)
Dept: URGENT CARE | Facility: PHYSICIAN GROUP | Age: 41
End: 2021-10-20
Payer: COMMERCIAL

## 2021-10-20 VITALS
OXYGEN SATURATION: 96 % | HEIGHT: 69 IN | TEMPERATURE: 98.2 F | WEIGHT: 207 LBS | BODY MASS INDEX: 30.66 KG/M2 | HEART RATE: 110 BPM | SYSTOLIC BLOOD PRESSURE: 118 MMHG | DIASTOLIC BLOOD PRESSURE: 80 MMHG | RESPIRATION RATE: 16 BRPM

## 2021-10-20 DIAGNOSIS — Z23 NEED FOR INFLUENZA VACCINATION: ICD-10-CM

## 2021-10-20 DIAGNOSIS — S61.209A AVULSION OF SKIN OF FINGER, INITIAL ENCOUNTER: ICD-10-CM

## 2021-10-20 PROCEDURE — 90472 IMMUNIZATION ADMIN EACH ADD: CPT | Performed by: PHYSICIAN ASSISTANT

## 2021-10-20 PROCEDURE — 90715 TDAP VACCINE 7 YRS/> IM: CPT | Performed by: PHYSICIAN ASSISTANT

## 2021-10-20 PROCEDURE — 90686 IIV4 VACC NO PRSV 0.5 ML IM: CPT | Performed by: PHYSICIAN ASSISTANT

## 2021-10-20 PROCEDURE — 99213 OFFICE O/P EST LOW 20 MIN: CPT | Mod: 25 | Performed by: PHYSICIAN ASSISTANT

## 2021-10-20 PROCEDURE — 90471 IMMUNIZATION ADMIN: CPT | Performed by: PHYSICIAN ASSISTANT

## 2021-10-20 RX ORDER — SULFAMETHOXAZOLE AND TRIMETHOPRIM 800; 160 MG/1; MG/1
1 TABLET ORAL 2 TIMES DAILY
Qty: 10 TABLET | Refills: 0 | Status: SHIPPED | OUTPATIENT
Start: 2021-10-20 | End: 2021-10-25

## 2021-10-20 ASSESSMENT — ENCOUNTER SYMPTOMS
SENSORY CHANGE: 0
TINGLING: 0
CHILLS: 0
BLURRED VISION: 0
VOMITING: 0
NAUSEA: 0
FEVER: 0

## 2021-10-20 NOTE — PROGRESS NOTES
Subjective     Eric Ahn is a 41 y.o. male who presents with Finger Injury (cut finger on Monday is hot an sore)    HPI:  Eric Ahn is a 41 y.o. male who presents today for an injury to his right index finger.  Patient was in the mandolin slicer 2 days ago when he cut the tip of his right index finger off.  He immediately applied pressure and the bleeding would not stop so then he used quick clot.  Yesterday started to bleed again a little more so he dunked the finger in tran grease.  He has not noticed any purulent discharge, surrounding redness, or numbness/tingling.  No fever/chills.  He does have pain and tenderness at the wound site.  His last tetanus shot was in 2012.      Review of Systems   Constitutional: Negative for chills and fever.   Eyes: Negative for blurred vision.   Gastrointestinal: Negative for nausea and vomiting.   Musculoskeletal: Negative for joint pain.   Skin:        Injury to skin of right index finger   Neurological: Negative for tingling and sensory change.         PMH:  has no past medical history of Allergy, unspecified not elsewhere classified, Anemia, Anxiety, Arrhythmia, Asthma, Cancer ([facility]), CHF (congestive heart failure) ([facility]), Chronic airway obstruction, not elsewhere classified, Depression, Emphysema, Glaucoma, Heart attack ([facility]), Heart murmur, Hyperlipidemia, Hypertension, Kidney disease, Muscle disorder, Seizure ([facility]), Substance abuse ([facility]), Type II or unspecified type diabetes mellitus without mention of complication, not stated as uncontrolled, or Unspecified cataract.  MEDS:   Current Outpatient Medications:   •  sulfamethoxazole-trimethoprim (BACTRIM DS) 800-160 MG tablet, Take 1 Tablet by mouth 2 times a day for 5 days., Disp: 10 Tablet, Rfl: 0  •  omeprazole (PRILOSEC) 20 MG delayed-release capsule, Take 1 capsule by mouth every day., Disp: 30 capsule, Rfl: 0  •  cyclobenzaprine (FLEXERIL) 5 mg tablet, Take 1-2 Tabs by mouth 3 times a day as needed.,  "Disp: 30 Tab, Rfl: 0  •  methylPREDNISolone (MEDROL DOSEPAK) 4 MG Tablet Therapy Pack, Follow schedule on package instructions. (Patient not taking: Reported on 3/19/2021), Disp: 21 Tab, Rfl: 0  ALLERGIES: No Known Allergies  SURGHX: No past surgical history on file.  SOCHX:  reports that he has never smoked. His smokeless tobacco use includes chew. He reports current alcohol use. He reports that he does not use drugs.  FH: Family history was reviewed, no pertinent findings to report      Objective     /80   Pulse (!) 110   Temp 36.8 °C (98.2 °F) (Temporal)   Resp 16   Ht 1.753 m (5' 9\")   Wt 93.9 kg (207 lb)   SpO2 96%   BMI 30.57 kg/m²      Physical Exam  Constitutional:       Appearance: He is well-developed.   HENT:      Head: Normocephalic and atraumatic.      Right Ear: External ear normal.      Left Ear: External ear normal.   Eyes:      Conjunctiva/sclera: Conjunctivae normal.      Pupils: Pupils are equal, round, and reactive to light.   Cardiovascular:      Rate and Rhythm: Normal rate and regular rhythm.      Heart sounds: Normal heart sounds. No murmur heard.     Pulmonary:      Effort: Pulmonary effort is normal.      Breath sounds: Normal breath sounds. No wheezing.   Musculoskeletal:      Comments: Distal portion of right index finger exhibits an avulsion injury.  There is overlying scab in place with tenderness palpation.  No purulent discharge or surrounding erythema.  No significant soft tissue swelling.  Distal neurovascular intact.  Cap refill less than 2 seconds.  No foreign body observed.  Patient has full range of motion of the affected digit.   Skin:     General: Skin is warm and dry.      Capillary Refill: Capillary refill takes less than 2 seconds.   Neurological:      Mental Status: He is alert and oriented to person, place, and time.   Psychiatric:         Behavior: Behavior normal.         Judgment: Judgment normal.           Assessment & Plan        1. Avulsion of skin of " finger, initial encounter  - sulfamethoxazole-trimethoprim (BACTRIM DS) 800-160 MG tablet; Take 1 Tablet by mouth 2 times a day for 5 days.  Dispense: 10 Tablet; Refill: 0  - Tdap Vaccine =>6YO IM  No signs of infection at this point patient was given a contingent prescription for antibiotics to fill if he starts to notice any purulent discharge, surrounding redness, or worsening swelling.  Recommend he keep the wound clean, dry, and covered while doing work with his hands.  He may use OTC analgesics as needed for pain/discomfort.  He may apply ice multiple times per day as needed for pain.  Follow-up as needed.    2. Need for influenza vaccination  - INFLUENZA VACCINE QUAD INJ (PF)  Patient asked about receiving the influenza vaccine since he was getting a tetanus vaccine today.            Differential Diagnosis, natural history, and supportive care discussed. Return to the Urgent Care or follow up with your PCP if symptoms fail to resolve, or for any new or worsening symptoms. Emergency room precautions discussed. Patient and/or family appears understanding of information.

## 2024-03-01 ENCOUNTER — OFFICE VISIT (OUTPATIENT)
Dept: MEDICAL GROUP | Facility: PHYSICIAN GROUP | Age: 44
End: 2024-03-01
Payer: COMMERCIAL

## 2024-03-01 VITALS
WEIGHT: 228 LBS | HEIGHT: 68 IN | HEART RATE: 89 BPM | SYSTOLIC BLOOD PRESSURE: 138 MMHG | RESPIRATION RATE: 16 BRPM | OXYGEN SATURATION: 96 % | TEMPERATURE: 98.5 F | BODY MASS INDEX: 34.56 KG/M2 | DIASTOLIC BLOOD PRESSURE: 101 MMHG

## 2024-03-01 DIAGNOSIS — Z72.0 CHEWING TOBACCO USE: ICD-10-CM

## 2024-03-01 DIAGNOSIS — I10 PRIMARY HYPERTENSION: Primary | ICD-10-CM

## 2024-03-01 DIAGNOSIS — R29.818 SUSPECTED SLEEP APNEA: ICD-10-CM

## 2024-03-01 DIAGNOSIS — F51.04 PSYCHOPHYSIOLOGICAL INSOMNIA: ICD-10-CM

## 2024-03-01 PROBLEM — G47.00 INSOMNIA: Status: ACTIVE | Noted: 2024-03-01

## 2024-03-01 PROCEDURE — 3080F DIAST BP >= 90 MM HG: CPT | Performed by: STUDENT IN AN ORGANIZED HEALTH CARE EDUCATION/TRAINING PROGRAM

## 2024-03-01 PROCEDURE — 3075F SYST BP GE 130 - 139MM HG: CPT | Performed by: STUDENT IN AN ORGANIZED HEALTH CARE EDUCATION/TRAINING PROGRAM

## 2024-03-01 PROCEDURE — 99396 PREV VISIT EST AGE 40-64: CPT | Performed by: STUDENT IN AN ORGANIZED HEALTH CARE EDUCATION/TRAINING PROGRAM

## 2024-03-01 PROCEDURE — 99214 OFFICE O/P EST MOD 30 MIN: CPT | Mod: 25 | Performed by: STUDENT IN AN ORGANIZED HEALTH CARE EDUCATION/TRAINING PROGRAM

## 2024-03-01 RX ORDER — LOSARTAN POTASSIUM 25 MG/1
25 TABLET ORAL DAILY
Qty: 60 TABLET | Refills: 0 | Status: SHIPPED | OUTPATIENT
Start: 2024-03-01 | End: 2024-03-12 | Stop reason: SDUPTHER

## 2024-03-01 RX ORDER — VARENICLINE TARTRATE 0.5 (11)-1
KIT ORAL
Qty: 53 EACH | Refills: 0 | Status: SHIPPED | OUTPATIENT
Start: 2024-03-01 | End: 2024-03-12

## 2024-03-01 RX ORDER — VARENICLINE TARTRATE 1 MG/1
TABLET, FILM COATED ORAL
Qty: 60 TABLET | Refills: 3 | Status: SHIPPED | OUTPATIENT
Start: 2024-03-01 | End: 2024-03-12

## 2024-03-01 ASSESSMENT — ENCOUNTER SYMPTOMS
INSOMNIA: 1
NERVOUS/ANXIOUS: 1
SHORTNESS OF BREATH: 0
CHILLS: 0
NAUSEA: 0
DIZZINESS: 0
FOCAL WEAKNESS: 0
FEVER: 0
ORTHOPNEA: 0
COUGH: 0
ABDOMINAL PAIN: 0
HEADACHES: 0
PALPITATIONS: 0
WHEEZING: 0
VOMITING: 0

## 2024-03-01 ASSESSMENT — PATIENT HEALTH QUESTIONNAIRE - PHQ9: CLINICAL INTERPRETATION OF PHQ2 SCORE: 0

## 2024-03-01 NOTE — PROGRESS NOTES
Verbal Consent given for YOUNG recording software    HISTORY OF PRESENT ILLNESS: Eric is a pleasant 44 y.o. male, new  patient who presents today to discuss medical problems as listed below:    History of Present Illness  The patient is a 44-year-old male who presents to the office to establish care.    His blood pressure has been increasing over the last couple of years. He had a physical yesterday for work and his blood pressure was elevated at that time. He has never been on medications for his blood pressure. He has a manual blood pressure machine at home. He denies any chest pain, shortness of breath, swelling in his legs, or dizziness. He gets usual headaches, but nothing too severe. He snores loudly at night and has difficulty sleeping. He gets tired throughout the day and takes naps. He has never been tested for sleep apnea. His wife has told him that he stops breathing for a second. He does add salt to his food. He eats home meals all the time. He chews tobacco a lot. He noticed when he was younger, he would eat everything with a spoon because he was always shaking. Caffeine triggers it more. He denies any nausea, vomiting, abdominal pain, burning with urination, fevers, or night sweats. He does not urinate a lot.    He has abused Benadryl and melatonin a lot over the last year. He was taking 20 mg of melatonin to fall asleep. He thinks he has anxiety. He drinks alcohol a couple of times a week. He denies any recreational drug use. He went to the dentist 2 days ago and was told that Chantix is off the market now. He quit smoking with Chantix for 8 years. He thinks he has anxiety.   He works in the fire department.   His mother had sarcoidosis. His father had high blood pressure and leukemia. He has 1 biological brother. He is not aware of any family history of colon cancer.   He has been seeing a chiropractor. He used to work out a lot more.       Current Outpatient Medications Ordered in Epic    Medication Sig Dispense Refill    losartan (COZAAR) 25 MG Tab Take 1 Tablet by mouth every day. 60 Tablet 0    Varenicline Tartrate, Starter, 0.5 MG X 11 & 1 MG X 42 Tablet Therapy Pack Start 1 week before quit date.  Take 0.5 mg daily for 3 days then 0.5 mg twice per day for 4 days then 1 mg twice per day for 12 weeks.  Take with food and full glass of water. 53 Each 0    varenicline (CHANTIX) 1 MG tablet Start 1 week before quit date.  Take 0.5 mg daily for 3 days then 0.5 mg twice per day for 4 days then 1 mg twice per day for 12 weeks.  Take with food and full glass of water. 60 Tablet 3     No current Epic-ordered facility-administered medications on file.       Review of systems:  Review of Systems   Constitutional:  Negative for chills and fever.   Respiratory:  Negative for cough, shortness of breath and wheezing.    Cardiovascular:  Negative for chest pain, palpitations, orthopnea and leg swelling.   Gastrointestinal:  Negative for abdominal pain, nausea and vomiting.   Genitourinary:  Negative for dysuria.   Musculoskeletal:  Negative for joint pain.   Neurological:  Negative for dizziness, focal weakness and headaches.   Psychiatric/Behavioral:  The patient is nervous/anxious and has insomnia.          Patient Active Problem List    Diagnosis Date Noted    Primary hypertension 03/01/2024    Insomnia 03/01/2024    Suspected sleep apnea 03/01/2024     No past surgical history on file.  Social History     Tobacco Use    Smoking status: Never    Smokeless tobacco: Current     Types: Chew   Vaping Use    Vaping Use: Never used   Substance Use Topics    Alcohol use: Yes     Comment: occ    Drug use: No      Family History   Problem Relation Age of Onset    Lung Disease Mother     Cancer Father     Hypertension Father     No Known Problems Brother      Current Outpatient Medications   Medication Sig Dispense Refill    losartan (COZAAR) 25 MG Tab Take 1 Tablet by mouth every day. 60 Tablet 0    Varenicline  "Tartrate, Starter, 0.5 MG X 11 & 1 MG X 42 Tablet Therapy Pack Start 1 week before quit date.  Take 0.5 mg daily for 3 days then 0.5 mg twice per day for 4 days then 1 mg twice per day for 12 weeks.  Take with food and full glass of water. 53 Each 0    varenicline (CHANTIX) 1 MG tablet Start 1 week before quit date.  Take 0.5 mg daily for 3 days then 0.5 mg twice per day for 4 days then 1 mg twice per day for 12 weeks.  Take with food and full glass of water. 60 Tablet 3     No current facility-administered medications for this visit.       Allergies:  No Known Allergies    Allergies, past medical history, past surgical history, family history, social history reviewed and updated.    Objective:    BP (!) 138/101 (BP Location: Right arm, Patient Position: Sitting, BP Cuff Size: Adult long)   Pulse 89   Temp 36.9 °C (98.5 °F) (Temporal)   Resp 16   Ht 1.727 m (5' 8\")   Wt 103 kg (228 lb)   SpO2 96%   BMI 34.67 kg/m²    Body mass index is 34.67 kg/m².    Physical exam:  General: Normal appearance, no acute distress, not ill-appearing  HEENT: EOM intact, conjunctiva normal limits, negative right/left eye discharge.  Sclera anicteric  Cardiovascular: Normal rate and rhythm, no murmurs  Pulmonary: No respiratory distress, no wheezing, no rales, breath sounds normal.  Abdomen: Bowel sounds normal, flat, soft.  Musculoskeletal: No edema bilaterally  Skin: Warm, dry, no lesions  Neurological: No focal deficits, normal gait  Psychiatric: Mood within normal limits    Assessment/Plan:    Assessment & Plan  1. Hypertension.  His diastolic blood pressure is elevated 101. At job physical 159/99. His labs look good, normal Cr, GFR. Sleep apnea puts a strain on his heart and his blood pressure rises, will screen for sleep apnea. I will start him on losartan 25 mg daily. He will get a blood pressure machine with an arm cuff and check his blood pressure at home every day. He was advised to stay well hydrated while on this " medication. He was advised to lose weight, exercise, and eat healthier. He was advised to watch his portion sizes and snacks. Will also need to DC chew tobacco use which increases BP    2. Insomnia.  Can discuss medication options at next visit, likely related to anxiety   Stop bang 6  I suspect he has sleep apnea. I will send him for a home sleep study.    3. Chewing tobacco cessation.  I will prescribe varenicline.    4. Health maintenance.  Patient here for a preventive medicine visit today and to establish care.  -Reviewed all past medical history, family history, social history    -Diet and exercise appropriate counseling given  -Social determinants of health reviewed  -Tobacco, alcohol, recreational drug use: advised on chew tobacco cessation  -Occupation:   -Cholesterol screening: requested  -Diabetes screening: elevated fasting glucose will do POC A1c at next visit   Immunizations  Immunizations: dec flu    Patient Counseling:  --Discussed moderation in caloric intake, sufficient fresh fruits/vegetables, fiber, iron  --Discussed brushing, flossing, and dental visits.   --Encouraged regular exercise.   --Discussed tobacco, alcohol, or other drug use.  --Discussed sexually transmitted infections, partner selection  --Injury prevention: Discussed safety belts, safety helmets, smoke detector, etc.      Follow-up  The patient will follow up in 10 days.         Problem List Items Addressed This Visit       Primary hypertension - Primary    Relevant Medications    losartan (COZAAR) 25 MG Tab    Insomnia    Suspected sleep apnea     Stop bang 6         Relevant Orders    Overnight Home Sleep Study     Other Visit Diagnoses       Chewing tobacco use        Relevant Medications    Varenicline Tartrate, Starter, 0.5 MG X 11 & 1 MG X 42 Tablet Therapy Pack    varenicline (CHANTIX) 1 MG tablet              Return in about 10 days (around 3/11/2024) for blood pressure.

## 2024-03-12 ENCOUNTER — APPOINTMENT (OUTPATIENT)
Dept: MEDICAL GROUP | Facility: PHYSICIAN GROUP | Age: 44
End: 2024-03-12
Payer: COMMERCIAL

## 2024-03-12 ENCOUNTER — OFFICE VISIT (OUTPATIENT)
Dept: MEDICAL GROUP | Facility: PHYSICIAN GROUP | Age: 44
End: 2024-03-12
Payer: COMMERCIAL

## 2024-03-12 VITALS
DIASTOLIC BLOOD PRESSURE: 84 MMHG | WEIGHT: 229.28 LBS | TEMPERATURE: 96.4 F | HEIGHT: 68 IN | HEART RATE: 96 BPM | RESPIRATION RATE: 16 BRPM | BODY MASS INDEX: 34.75 KG/M2 | SYSTOLIC BLOOD PRESSURE: 116 MMHG | OXYGEN SATURATION: 97 %

## 2024-03-12 DIAGNOSIS — E78.00 PURE HYPERCHOLESTEROLEMIA: ICD-10-CM

## 2024-03-12 DIAGNOSIS — I10 PRIMARY HYPERTENSION: ICD-10-CM

## 2024-03-12 PROCEDURE — 99214 OFFICE O/P EST MOD 30 MIN: CPT | Performed by: STUDENT IN AN ORGANIZED HEALTH CARE EDUCATION/TRAINING PROGRAM

## 2024-03-12 PROCEDURE — 3074F SYST BP LT 130 MM HG: CPT | Performed by: STUDENT IN AN ORGANIZED HEALTH CARE EDUCATION/TRAINING PROGRAM

## 2024-03-12 PROCEDURE — 93000 ELECTROCARDIOGRAM COMPLETE: CPT | Performed by: STUDENT IN AN ORGANIZED HEALTH CARE EDUCATION/TRAINING PROGRAM

## 2024-03-12 PROCEDURE — 3079F DIAST BP 80-89 MM HG: CPT | Performed by: STUDENT IN AN ORGANIZED HEALTH CARE EDUCATION/TRAINING PROGRAM

## 2024-03-12 RX ORDER — LOSARTAN POTASSIUM 50 MG/1
25 TABLET ORAL 2 TIMES DAILY
Qty: 100 TABLET | Refills: 1 | Status: SHIPPED | OUTPATIENT
Start: 2024-03-12

## 2024-03-12 RX ORDER — ROSUVASTATIN CALCIUM 5 MG/1
5 TABLET, COATED ORAL EVERY EVENING
Qty: 60 TABLET | Refills: 1 | Status: SHIPPED | OUTPATIENT
Start: 2024-03-12

## 2024-03-13 ASSESSMENT — ENCOUNTER SYMPTOMS
WHEEZING: 0
FEVER: 0
FOCAL WEAKNESS: 0
CHILLS: 0
HEADACHES: 0
ABDOMINAL PAIN: 0
NAUSEA: 0
DIZZINESS: 1
PALPITATIONS: 0
COUGH: 0
SHORTNESS OF BREATH: 0
VOMITING: 0
ORTHOPNEA: 0

## 2024-03-13 NOTE — PROGRESS NOTES
Verbal Consent given for YOUNG recording software    HISTORY OF PRESENT ILLNESS: Eric is a pleasant 44 y.o. male, established patient who presents today to discuss medical problems as listed below:    History of Present Illness  The patient presents for follow-up on his blood pressures.    He has been recording his blood pressures as instructed. His diastolics are still high and it seems to be tapering off at night. He has been having dizziness a lot lately. He woke up this morning at 8 a.m. feeling super dizzy and nauseous. He checked his blood pressure and it was a little high. When he first started taking the medications, he had tunnel vision and everything went black except for the little hole and he almost passed out. This was 48 hours after taking the medications. This has never happened to him before even with his high blood pressure. This has not happened since      He eats home cooked meals. He has increased his salt intake. He eats a lot of white rice and pasta. He has not been exercising much for the last 2 years. He has noticed shortness of breath quickly. He denies any chest pain.    He has not received a call from the sleep study. He has not started chewing tobacco yet. He has not made a full decision to quit yet.     His grandfather had some heart issues. His father was on blood pressure medication. He is not sure about his cholesterol. He has a family history of diabetes on his mother's side.       Current Outpatient Medications Ordered in Epic   Medication Sig Dispense Refill    losartan (COZAAR) 50 MG Tab Take 0.5 Tablets by mouth 2 times a day. 100 Tablet 1    rosuvastatin (CRESTOR) 5 MG Tab Take 1 Tablet by mouth every evening. 60 Tablet 1     No current Epic-ordered facility-administered medications on file.       Review of systems:  Review of Systems   Constitutional:  Negative for chills and fever.   Respiratory:  Negative for cough, shortness of breath and wheezing.    Cardiovascular:   "Negative for chest pain, palpitations, orthopnea and leg swelling.   Gastrointestinal:  Negative for abdominal pain, nausea and vomiting.   Musculoskeletal:  Negative for joint pain.   Neurological:  Positive for dizziness. Negative for focal weakness and headaches.         Patient Active Problem List    Diagnosis Date Noted    Primary hypertension 03/01/2024    Insomnia 03/01/2024    Suspected sleep apnea 03/01/2024     History reviewed. No pertinent surgical history.  Social History     Tobacco Use    Smoking status: Never    Smokeless tobacco: Current     Types: Chew   Vaping Use    Vaping Use: Never used   Substance Use Topics    Alcohol use: Yes     Comment: occ    Drug use: No      Family History   Problem Relation Age of Onset    Lung Disease Mother     Cancer Father     Hypertension Father     No Known Problems Brother      Current Outpatient Medications   Medication Sig Dispense Refill    losartan (COZAAR) 50 MG Tab Take 0.5 Tablets by mouth 2 times a day. 100 Tablet 1    rosuvastatin (CRESTOR) 5 MG Tab Take 1 Tablet by mouth every evening. 60 Tablet 1     No current facility-administered medications for this visit.       Allergies:  No Known Allergies    Allergies, past medical history, past surgical history, family history, social history reviewed and updated.    Objective:    /84   Pulse 96   Temp (!) 35.8 °C (96.4 °F) (Temporal)   Resp 16   Ht 1.727 m (5' 8\")   Wt 104 kg (229 lb 4.5 oz)   SpO2 97%   BMI 34.86 kg/m²    Body mass index is 34.86 kg/m².    Physical exam:  General: Normal appearance, no acute distress, not ill-appearing  HEENT: EOM intact, conjunctiva normal limits, negative right/left eye discharge.  Sclera anicteric  Cardiovascular: Normal rate and rhythm, no murmurs  Pulmonary: No respiratory distress, no wheezing, no rales, breath sounds normal.  Musculoskeletal: No edema bilaterally  Skin: Warm, dry, no lesions  Neurological: No focal deficits, normal gait  Psychiatric: " Mood within normal limits    Assessment/Plan:    Assessment & Plan  1. Hypertension.  His diastolics are still in the 90 range. I will increase his blood pressure medication to 50 mg 0.5 tablet in the morning and 0.5 tablet at night.  He is having some dizziness that is now resolved, not associated with hypotension.  Possible side effect of medication.  If continues to happen consider changing the medication.  Counseled on diet, exercise interventions    Baseline EKG today: Normal sinus rate and rhythm, no signs of LVH, normal intervals, normal axis, no ST changes, no T wave versions    2. Elevated cholesterol.  LDL greater than 190.  he was advised not to use canola and seed oils. He was recommended to eat more vegetables and meat. He was advised to stay away from breads, rice, and pastas. I will start him on a low-dose cholesterol medication.     Rx for 5 mg daily      Follow-up  The patient will follow up in 2 to 3 weeks for a virtual visit.       Return in about 4 weeks (around 4/9/2024), or if symptoms worsen or fail to improve, for blood pressure.

## 2024-04-01 ENCOUNTER — TELEPHONE (OUTPATIENT)
Dept: HEALTH INFORMATION MANAGEMENT | Facility: OTHER | Age: 44
End: 2024-04-01
Payer: COMMERCIAL

## 2024-04-05 ENCOUNTER — TELEMEDICINE (OUTPATIENT)
Dept: MEDICAL GROUP | Facility: PHYSICIAN GROUP | Age: 44
End: 2024-04-05
Payer: COMMERCIAL

## 2024-04-05 VITALS — BODY MASS INDEX: 34.86 KG/M2 | HEIGHT: 68 IN

## 2024-04-05 DIAGNOSIS — I10 PRIMARY HYPERTENSION: ICD-10-CM

## 2024-04-05 DIAGNOSIS — E78.00 PURE HYPERCHOLESTEROLEMIA: ICD-10-CM

## 2024-04-05 PROCEDURE — 99213 OFFICE O/P EST LOW 20 MIN: CPT | Performed by: STUDENT IN AN ORGANIZED HEALTH CARE EDUCATION/TRAINING PROGRAM

## 2024-04-05 RX ORDER — ROSUVASTATIN CALCIUM 5 MG/1
5 TABLET, COATED ORAL EVERY EVENING
Qty: 100 TABLET | Refills: 1 | Status: SHIPPED | OUTPATIENT
Start: 2024-04-05

## 2024-04-05 RX ORDER — LOSARTAN POTASSIUM 25 MG/1
25 TABLET ORAL
Qty: 200 TABLET | Refills: 2 | Status: SHIPPED | OUTPATIENT
Start: 2024-04-05 | End: 2025-01-30

## 2024-04-05 NOTE — PROGRESS NOTES
Verbal Consent given for YOUNG recording software    HISTORY OF PRESENT ILLNESS: Eric is a pleasant 44 y.o. male, established patient who presents today to discuss medical problems as listed below:    History of Present Illness  The patient is a 44-year-old male here for a follow-up on his blood pressures.    The patient is currently on a regimen of losartan, administered at a dosage of 25 mg twice daily, to manage his nocturnal hypertension. He reports an improvement in his blood pressure, with morning readings within the normal range. His sleep quality has also improved. Occasionally, he experiences mild dizziness when agitated, a symptom he attributes to his initial acclimatization to the medication.    The patient's wife has observed weight loss. Despite not engaging in weightlifting, he is making efforts to incorporate treadmill exercise into his routine to enhance his exercise tolerance. He has also made dietary modifications, incorporating a low carbohydrate diet.    The patient is scheduled for a sleep study on and is inquiring about the possibility of receiving a mail-in sleep study from his insurance.       Current Outpatient Medications Ordered in Epic   Medication Sig Dispense Refill    rosuvastatin (CRESTOR) 5 MG Tab Take 1 Tablet by mouth every evening. 100 Tablet 1    losartan (COZAAR) 25 MG Tab Take 1 Tablet by mouth 2 times a day for 300 days. 200 Tablet 2     No current Epic-ordered facility-administered medications on file.       Review of systems:  Per HPI    Patient Active Problem List    Diagnosis Date Noted    Primary hypertension 03/01/2024    Insomnia 03/01/2024    Suspected sleep apnea 03/01/2024     History reviewed. No pertinent surgical history.  Social History     Tobacco Use    Smoking status: Never    Smokeless tobacco: Current     Types: Chew   Vaping Use    Vaping Use: Never used   Substance Use Topics    Alcohol use: Yes     Comment: occ    Drug use: No      Family History  "  Problem Relation Age of Onset    Lung Disease Mother     Cancer Father     Hypertension Father     No Known Problems Brother      Current Outpatient Medications   Medication Sig Dispense Refill    rosuvastatin (CRESTOR) 5 MG Tab Take 1 Tablet by mouth every evening. 100 Tablet 1    losartan (COZAAR) 25 MG Tab Take 1 Tablet by mouth 2 times a day for 300 days. 200 Tablet 2     No current facility-administered medications for this visit.       Allergies:  No Known Allergies    Allergies, past medical history, past surgical history, family history, social history reviewed and updated.    Objective:    Ht 1.727 m (5' 8\")   BMI 34.86 kg/m²    Body mass index is 34.86 kg/m².    Physical exam:  General: Normal appearance, no acute distress, not ill-appearing  HEENT: EOM intact, conjunctiva normal limits, negative right/left eye discharge.  Sclera anicteric  Cardiovascular: Normal rate and rhythm, no murmurs  Pulmonary: No respiratory distress, no wheezing, no rales, breath sounds normal.  Musculoskeletal: No edema bilaterally  Skin: Warm, dry, no lesions  Neurological: No focal deficits, normal gait  Psychiatric: Mood within normal limits    Assessment/Plan:  Assessment & Plan  1. Hypertension  A prescription for losartan 25 mg, to be taken as half a tablet twice daily, has been issued.  Chronic, stable condition. Continue current regimen.     2. Weight management.  The patient was informed that body composition will take time. Cont diet and exercise interventions     3. Sleep study.  Patient to follow up with sleep study       Follow-up  The patient is scheduled for a follow-up visit in 6 months, or earlier if his blood pressure is elevated.       Problem List Items Addressed This Visit       Primary hypertension    Relevant Medications    rosuvastatin (CRESTOR) 5 MG Tab    losartan (COZAAR) 25 MG Tab     Other Visit Diagnoses       Pure hypercholesterolemia        Relevant Medications    rosuvastatin (CRESTOR) 5 MG " Tab    losartan (COZAAR) 25 MG Tab            Return in about 6 months (around 10/5/2024), or if symptoms worsen or fail to improve.

## 2024-04-09 NOTE — PROGRESS NOTES
"Virtual Visit: Established Patient   This visit was conducted via Zoom using secure and encrypted videoconferencing technology.   The patient was in their home in the [facility].    The patient's identity was confirmed and verbal consent was obtained for this virtual visit.    Subjective:   CC:   Chief Complaint   Patient presents with    Other     Check up BP     Eric Ahn is a 44 y.o. male presenting for evaluation and management of:    History of Present Illness  The patient is a 44-year-old male here for a follow-up on his blood pressures.    The patient is currently on a regimen of losartan, administered at a dosage of 25 mg twice daily, to manage his nocturnal hypertension. He reports an improvement in his blood pressure, with morning readings within the normal range. His sleep quality has also improved. Occasionally, he experiences mild dizziness when agitated, a symptom he attributes to his initial acclimatization to the medication.    The patient's wife has observed weight loss. Despite not engaging in weightlifting, he is making efforts to incorporate treadmill exercise into his routine to enhance his lung function. He has also made dietary modifications, incorporating a low carbohydrate diet.    The patient is scheduled for a sleep study and is inquiring about the possibility of receiving a mail-in sleep study from his insurance.        ROS   Prt hpi    Current medicines (including changes today)  Current Outpatient Medications   Medication Sig Dispense Refill    rosuvastatin (CRESTOR) 5 MG Tab Take 1 Tablet by mouth every evening. 100 Tablet 1    losartan (COZAAR) 25 MG Tab Take 1 Tablet by mouth 2 times a day for 300 days. 200 Tablet 2     No current facility-administered medications for this visit.       Patient Active Problem List    Diagnosis Date Noted    Primary hypertension 03/01/2024    Insomnia 03/01/2024    Suspected sleep apnea 03/01/2024        Objective:   Ht 1.727 m (5' 8\")   " BMI 34.86 kg/m²     Physical Exam:  Constitutional: Alert, no distress, well-groomed.  Skin: No rashes in visible areas.  Eye: Round. Conjunctiva clear, lids normal. No icterus.   ENMT: Lips pink without lesions, good dentition, moist mucous membranes. Phonation normal.  Neck: No masses, no thyromegaly. Moves freely without pain.  Respiratory: Unlabored respiratory effort, no cough or audible wheeze  Psych: Alert and oriented x3, normal affect and mood.     Assessment and Plan:   The following treatment plan was discussed:     Assessment & Plan  1. Hypertension.  Chronic, stable condition. Continue current regimen.  A prescription for losartan 25 mg, to be taken as half a tablet twice daily, has been issued.    2. Weight management.  The patient was informed that body composition will take time. Cont diet and exercise interventions. Add in resistance training    3. Sleep study.    Follow-up  The patient is scheduled for a follow-up visit in 6 months, or earlier if his blood pressure is elevated.    Problem List Items Addressed This Visit       Primary hypertension    Relevant Medications    rosuvastatin (CRESTOR) 5 MG Tab    losartan (COZAAR) 25 MG Tab     Other Visit Diagnoses       Pure hypercholesterolemia        Relevant Medications    rosuvastatin (CRESTOR) 5 MG Tab    losartan (COZAAR) 25 MG Tab              Follow-up: Return in about 6 months (around 10/5/2024), or if symptoms worsen or fail to improve.

## 2024-05-17 ENCOUNTER — HOME STUDY (OUTPATIENT)
Dept: SLEEP MEDICINE | Facility: MEDICAL CENTER | Age: 44
End: 2024-05-17
Attending: STUDENT IN AN ORGANIZED HEALTH CARE EDUCATION/TRAINING PROGRAM
Payer: COMMERCIAL

## 2024-05-17 DIAGNOSIS — R29.818 SUSPECTED SLEEP APNEA: ICD-10-CM

## 2024-06-01 NOTE — PROCEDURES
DIAGNOSTIC HOME SLEEP TEST (HST) REPORT WatchPAT      PATIENT ID:  NAME:  Eric Ahn  MRN:               [id number]  YOB: 1980  DATE OF STUDY:05/17/2024       Impression:     This study shows evidence of:      1. Severe obstructive sleep apnea with PAT apnea hyponea index (pAHI 3%) of 47.7 per hour.  PAT respiratory disturbance index (pRDI) was 47.9 per hour. These findings are based on 7 channels recording of PAT signal with sleep staging, heart rate, pulse oximetry, actigraphy, body position, snoring and respiratory movement.     2. Oxygenation O2 Sat. mean O2 sat was 88%,  therese was 70%,  and maximum O2 at 98 %. O2 sat was at or  below 88% for 181.2 min of evaluation time. Oxygen Desaturation (4%) Index was elevated at 34.2/hr. REM AHI: 50.4.  PAT apnea hyponea index (pAHI 4%) of 34.4 per hour.  AVG HR was 80 BPM.      TECHNICAL DESCRIPTION: Patient underwent home sleep apnea testing with peripheral arterial tone signal (WatchPAT™). This is a Type IV portable monitor and device. Monitoring was done with 7 channels recording of PAT signal with sleep staging, heart rate, pulse oximetry, actigraphy, body position, snoring and respiratory movement. Prior to using the device, the patient received verbal and written instructions for its application and was provided with the help desk phone number for additional telephonic instruction with 24-hour availability of qualified personnel to answer questions.    AHI  vs RDI:  The pRDI is calculated in a very similar way as the pAHI but an additional type of respiratory events named RERA are also counted. RERA  is the abbreviation for respiratory effort related arousal and is essentially a very short arousal of a few seconds that follows partial occlusion of the airway.  The normal range of the RDI score is also less than 5/hour.    General sleep summary: . Total recording time is 6 hours and 30 minutes and approximate sleep time is 6 hours  and 6 minutes. The patient spent 108.0 minutes in the supine position and 258.3 minutes in the nonsupine position.  Supine AHI (3%) 76.3.    Recommendations:    CPAP titration study       In general patients with sleep apnea are advised to avoid alcohol and sedatives and to not operate a motor vehicle while drowsy. In some cases alternative treatment options may prove effective in resolving sleep apnea in these options include upper airway surgery, the use of a dental orthotic or weight loss and positional therapy. Clinical correlation is required.

## 2024-07-09 DIAGNOSIS — E78.00 PURE HYPERCHOLESTEROLEMIA: ICD-10-CM

## 2024-07-09 RX ORDER — ROSUVASTATIN CALCIUM 5 MG/1
5 TABLET, COATED ORAL EVERY EVENING
Qty: 60 TABLET | Refills: 0 | Status: SHIPPED | OUTPATIENT
Start: 2024-07-09

## 2024-09-06 DIAGNOSIS — E78.00 PURE HYPERCHOLESTEROLEMIA: ICD-10-CM

## 2024-09-06 RX ORDER — ROSUVASTATIN CALCIUM 5 MG/1
5 TABLET, COATED ORAL EVERY EVENING
Qty: 100 TABLET | Refills: 2 | Status: SHIPPED | OUTPATIENT
Start: 2024-09-06

## 2024-09-06 NOTE — TELEPHONE ENCOUNTER
Last seen- 3/12/24  Last labs-    Received request via: Pharmacy    Was the patient seen in the last year in this department? Yes    Does the patient have an active prescription (recently filled or refills available) for medication(s) requested? No    Pharmacy Name: [facility]    Does the patient have [facility] and need 100-day supply? (This applies to ALL medications) Patient does not have SCP

## 2024-10-01 DIAGNOSIS — I10 PRIMARY HYPERTENSION: ICD-10-CM

## 2024-10-01 RX ORDER — LOSARTAN POTASSIUM 50 MG/1
25 TABLET ORAL 2 TIMES DAILY
Qty: 100 TABLET | Refills: 1 | Status: SHIPPED | OUTPATIENT
Start: 2024-10-01

## 2024-12-30 ENCOUNTER — OFFICE VISIT (OUTPATIENT)
Dept: URGENT CARE | Facility: PHYSICIAN GROUP | Age: 44
End: 2024-12-30
Payer: COMMERCIAL

## 2024-12-30 VITALS
DIASTOLIC BLOOD PRESSURE: 80 MMHG | HEART RATE: 119 BPM | RESPIRATION RATE: 20 BRPM | TEMPERATURE: 98.6 F | SYSTOLIC BLOOD PRESSURE: 124 MMHG | BODY MASS INDEX: 36.26 KG/M2 | WEIGHT: 238.5 LBS | OXYGEN SATURATION: 97 %

## 2024-12-30 DIAGNOSIS — J01.80 ACUTE NON-RECURRENT SINUSITIS OF OTHER SINUS: ICD-10-CM

## 2024-12-30 RX ORDER — DEXTROMETHORPHAN HYDROBROMIDE AND PROMETHAZINE HYDROCHLORIDE 15; 6.25 MG/5ML; MG/5ML
5 SYRUP ORAL EVERY 4 HOURS PRN
Qty: 120 ML | Refills: 0 | Status: SHIPPED | OUTPATIENT
Start: 2024-12-30

## 2024-12-30 RX ORDER — DOXYCYCLINE HYCLATE 100 MG
100 TABLET ORAL 2 TIMES DAILY
Qty: 14 TABLET | Refills: 0 | Status: SHIPPED | OUTPATIENT
Start: 2024-12-30 | End: 2025-01-06

## 2024-12-30 ASSESSMENT — ENCOUNTER SYMPTOMS
HEADACHES: 1
SORE THROAT: 1
CHILLS: 1
FEVER: 1
SHORTNESS OF BREATH: 1
COUGH: 1
MYALGIAS: 1
RHINORRHEA: 1

## 2024-12-30 ASSESSMENT — COPD QUESTIONNAIRES: COPD: 0

## 2024-12-30 NOTE — PROGRESS NOTES
Subjective:   Eric Ahn is a 44 y.o. male who presents for Cough (Started allen epifanio- worsening. Cough, congestion ( green mucous). Head and chest. Body aches. Weakness. Fevers on first few days. )        Cough  This is a new problem. The current episode started in the past 7 days. The problem occurs constantly. The cough is Productive of purulent sputum. Associated symptoms include chills, a fever (resolved), headaches, myalgias, nasal congestion (worsening, purulent d/c), rhinorrhea, a sore throat (from coughing) and shortness of breath. Treatments tried: equate cold and flu, tylenol cold and flu, advil, decongestant, netipot. The treatment provided mild relief. His past medical history is significant for asthma (childhood). There is no history of COPD or pneumonia.     Review of Systems   Constitutional:  Positive for chills and fever (resolved).   HENT:  Positive for rhinorrhea and sore throat (from coughing).    Respiratory:  Positive for cough and shortness of breath.    Musculoskeletal:  Positive for myalgias.   Neurological:  Positive for headaches.       PMH:  has a past medical history of Primary hypertension (3/1/2024).    He has no past medical history of Allergy, unspecified not elsewhere classified, Anemia, Anxiety, Arrhythmia, Asthma, Cancer ([facility]), CHF (congestive heart failure) ([facility]), Chronic airway obstruction, not elsewhere classified, Depression, Emphysema, Glaucoma, Heart attack ([facility]), Heart murmur, Hyperlipidemia, Kidney disease, Muscle disorder, Seizure ([facility]), Substance abuse ([facility]), Type II or unspecified type diabetes mellitus without mention of complication, not stated as uncontrolled, or Unspecified cataract.  MEDS:   Current Outpatient Medications:     VARENICLINE TARTRATE PO, Take  by mouth., Disp: , Rfl:     doxycycline (VIBRAMYCIN) 100 MG Tab, Take 1 Tablet by mouth 2 times a day for 7 days., Disp: 14 Tablet, Rfl: 0    promethazine-dextromethorphan (PROMETHAZINE-DM) 6.25-15  MG/5ML syrup, Take 5 mL by mouth every four hours as needed for Cough., Disp: 120 mL, Rfl: 0    rosuvastatin (CRESTOR) 5 MG Tab, TAKE 1 TABLET BY MOUTH EVERY EVENING, Disp: 100 Tablet, Rfl: 2    losartan (COZAAR) 25 MG Tab, Take 1 Tablet by mouth 2 times a day for 300 days., Disp: 200 Tablet, Rfl: 2    losartan (COZAAR) 50 MG Tab, TAKE 1/2 TABLET BY MOUTH TWICE DAILY (Patient not taking: Reported on 12/30/2024), Disp: 100 Tablet, Rfl: 1  ALLERGIES: No Known Allergies  SURGHX: History reviewed. No pertinent surgical history.  SOCHX:  reports that he has never smoked. His smokeless tobacco use includes chew. He reports current alcohol use. He reports that he does not use drugs.  FH: Family history was reviewed, no pertinent findings to report   Objective:   /80   Pulse (!) 119   Temp 37 °C (98.6 °F) (Temporal)   Resp 20   Wt 108 kg (238 lb 8 oz)   SpO2 97%   BMI 36.26 kg/m²   Physical Exam  Vitals reviewed.   HENT:      Right Ear: Tympanic membrane, ear canal and external ear normal.      Left Ear: Tympanic membrane, ear canal and external ear normal.      Nose: Congestion and rhinorrhea present.      Mouth/Throat:      Lips: Pink.      Mouth: Mucous membranes are moist.      Pharynx: Oropharynx is clear. Uvula midline. Posterior oropharyngeal erythema present.   Cardiovascular:      Rate and Rhythm: Regular rhythm. Tachycardia present.      Heart sounds: Normal heart sounds, S1 normal and S2 normal.   Pulmonary:      Effort: Pulmonary effort is normal. No respiratory distress.      Breath sounds: Normal breath sounds. No decreased breath sounds, wheezing, rhonchi or rales.           Assessment/Plan:   1. Acute non-recurrent sinusitis of other sinus  - doxycycline (VIBRAMYCIN) 100 MG Tab; Take 1 Tablet by mouth 2 times a day for 7 days.  Dispense: 14 Tablet; Refill: 0  - promethazine-dextromethorphan (PROMETHAZINE-DM) 6.25-15 MG/5ML syrup; Take 5 mL by mouth every four hours as needed for Cough.  Dispense:  120 mL; Refill: 0    Other orders  - VARENICLINE TARTRATE PO; Take  by mouth.    Considerations include but not limited to sinusitis, allergic rhinitis, viral URI, bronchitis, pneumonia.  History and exam today consistent with sinusitis.  Given duration of progression of symptoms I suspect that this is bacterial in nature.    Patient started on antibiotic therapy.  Recommend taking these with food to avoid GI upset.  Concurrent use of probiotic may also be beneficial.    Flonase 1 squirt in each nostril, as instructed in clinic, once a day.  Use nasal saline TID to promote drainage.   Salt water gurgles to soothe sore throat.  Ayr saline gel to moisturize nasal passage and prevent bleeding.  Try to use sudafed sparingly in order to allow sinuses to drain.  Avoid the longer formulations and try to take only in the morning and/or at noon if needed.  If you fail to improve in 3-5 days or symptoms worsen/new symptoms develop, RTC for reevaluation

## 2025-01-22 ENCOUNTER — OFFICE VISIT (OUTPATIENT)
Dept: URGENT CARE | Facility: PHYSICIAN GROUP | Age: 45
End: 2025-01-22
Payer: COMMERCIAL

## 2025-01-22 VITALS
OXYGEN SATURATION: 95 % | RESPIRATION RATE: 16 BRPM | DIASTOLIC BLOOD PRESSURE: 80 MMHG | TEMPERATURE: 98.8 F | HEART RATE: 105 BPM | SYSTOLIC BLOOD PRESSURE: 118 MMHG

## 2025-01-22 DIAGNOSIS — F51.04 PSYCHOPHYSIOLOGICAL INSOMNIA: ICD-10-CM

## 2025-01-22 DIAGNOSIS — G47.33 OSA (OBSTRUCTIVE SLEEP APNEA): ICD-10-CM

## 2025-01-22 DIAGNOSIS — R00.0 TACHYCARDIA: ICD-10-CM

## 2025-01-22 PROCEDURE — 3074F SYST BP LT 130 MM HG: CPT | Performed by: NURSE PRACTITIONER

## 2025-01-22 PROCEDURE — 99214 OFFICE O/P EST MOD 30 MIN: CPT | Performed by: NURSE PRACTITIONER

## 2025-01-22 PROCEDURE — 3079F DIAST BP 80-89 MM HG: CPT | Performed by: NURSE PRACTITIONER

## 2025-01-22 RX ORDER — HYDROXYZINE HYDROCHLORIDE 25 MG/1
25 TABLET, FILM COATED ORAL
Qty: 30 TABLET | Refills: 0 | Status: SHIPPED | OUTPATIENT
Start: 2025-01-22 | End: 2025-01-23 | Stop reason: SDUPTHER

## 2025-01-22 NOTE — PROGRESS NOTES
Subjective:   Eric Ahn is a 44 y.o. male who presents for Follow-Up (Was seen 12/30 still having congestion, since taking the medication, has a hard time sleeping )    Patient is a 44-year-old male presenting clinic today stating for the past month he is having a harder time sleeping at night.  He states these instances are usually worse when he is at work as a  at the station trying to sleep.  He feels that he gets very anxious and is scared to sleep at night because he has a hard time breathing and feels closed in.  Patient states he has to sleep with the door open.  He does have symptoms at home as well however they are less.  Patient denies any chest pain or shortness of breath throughout the day.  Patient did have a sleep study completed back in May however has not followed up with his primary care for results.    Has been taking 15 to 30 mg of melatonin nightly, Benadryl on occasion, has tried hydroxyzine which helped, and has drink alcohol to help with sleep.  Patient states when he is at home he feels that the beer has helped him and he drinks 3-4 beers per night.    Medications, Allergies, and current problem list reviewed today in Epic.     Objective:     /80   Pulse (!) 105   Temp 37.1 °C (98.8 °F) (Temporal)   Resp 16   SpO2 95%     Physical Exam  Vitals reviewed.   Constitutional:       General: He is not in acute distress.     Appearance: Normal appearance. He is not ill-appearing or toxic-appearing.   HENT:      Head: Normocephalic.      Nose: Nose normal.      Mouth/Throat:      Mouth: Mucous membranes are moist.   Eyes:      Extraocular Movements: Extraocular movements intact.      Conjunctiva/sclera: Conjunctivae normal.      Pupils: Pupils are equal, round, and reactive to light.   Cardiovascular:      Rate and Rhythm: Regular rhythm. Tachycardia present.   Pulmonary:      Effort: Pulmonary effort is normal.      Breath sounds: Normal breath sounds.   Musculoskeletal:          General: Normal range of motion.      Cervical back: Normal range of motion and neck supple.   Skin:     General: Skin is warm and dry.   Neurological:      General: No focal deficit present.      Mental Status: He is alert and oriented to person, place, and time.   Psychiatric:         Attention and Perception: Attention normal.         Mood and Affect: Affect normal. Mood is anxious.         Behavior: Behavior normal. Behavior is cooperative.         Thought Content: Thought content normal.         Cognition and Memory: Cognition and memory normal.         Judgment: Judgment normal.         Assessment/Plan:     Diagnosis and associated orders:     1. Psychophysiological insomnia  hydrOXYzine HCl (ATARAX) 25 MG Tab      2. KOTA (obstructive sleep apnea)        3. Tachycardia           Comments/MDM:     Condition with exacerbation.  Review of vital signs indicate tachycardia, patient was mildly tachycardic on physical exam as well, heart rate and rhythm was regular.  Patient is asymptomatic.  All other vital signs within normal range.  Did discuss and offer EKG however patient politely declined in clinic.  Normal EKG March 2024.    Patient presents to clinic today with worsening insomnia.  Discussed with patient at length that I do believe that this is due to his sleep apnea.  Did review sleep study as well as pulmonology report indicating PAT apnea hypoxia event (pAHI 3%) of 47.7 per hour.  PAT respiratory disturbance index was 47.9/h.  Oxygen saturation mean was 88%,therese was 70, minimum O2 at 98%.  PAT apnea hypoxia index 4% of 34.4.  Discussed findings with patient.  Recommended following up stat with primary care.  Was able to schedule make an appointment for tomorrow with Dr. Johnston.  Will give short-term course of hydroxyzine that he may use while he is at home however discussed with patient he should not use it while at work as he is a  and this could impair his job functions.  He may take  hydroxyzine for mild anxiety during the day and or to help with sleep.    Patient was involved with shared decision-making throughout the exam today and verbalizes understanding regards to plan of care, discharge instructions, and follow-up         Differential diagnosis, natural history, supportive care, and indications for immediate follow-up discussed.    Advised the patient to follow-up with the primary care physician for recheck, reevaluation, and consideration of further management.    I personally reviewed prior external notes and test results pertinent to today's visit as well as additional imaging and testing completed in clinic today.     Please note that this dictation was created using voice recognition software. I have made a reasonable attempt to correct obvious errors, but I expect that there are errors of grammar and possibly content that I did not discover before finalizing the note.

## 2025-01-23 ENCOUNTER — TELEMEDICINE (OUTPATIENT)
Dept: MEDICAL GROUP | Facility: PHYSICIAN GROUP | Age: 45
End: 2025-01-23
Payer: COMMERCIAL

## 2025-01-23 VITALS — BODY MASS INDEX: 33.34 KG/M2 | HEIGHT: 68 IN | WEIGHT: 220 LBS

## 2025-01-23 DIAGNOSIS — R74.01 ELEVATED ALT MEASUREMENT: ICD-10-CM

## 2025-01-23 DIAGNOSIS — I10 PRIMARY HYPERTENSION: ICD-10-CM

## 2025-01-23 DIAGNOSIS — E78.00 PURE HYPERCHOLESTEROLEMIA: ICD-10-CM

## 2025-01-23 DIAGNOSIS — G47.30 SEVERE SLEEP APNEA: ICD-10-CM

## 2025-01-23 DIAGNOSIS — F51.04 PSYCHOPHYSIOLOGICAL INSOMNIA: ICD-10-CM

## 2025-01-23 PROCEDURE — 99213 OFFICE O/P EST LOW 20 MIN: CPT | Performed by: STUDENT IN AN ORGANIZED HEALTH CARE EDUCATION/TRAINING PROGRAM

## 2025-01-23 RX ORDER — LOSARTAN POTASSIUM 25 MG/1
25 TABLET ORAL DAILY
COMMUNITY

## 2025-01-23 RX ORDER — ATORVASTATIN CALCIUM 10 MG/1
5 TABLET, FILM COATED ORAL NIGHTLY
Qty: 60 TABLET | Refills: 2 | Status: SHIPPED | OUTPATIENT
Start: 2025-01-23

## 2025-01-23 RX ORDER — HYDROXYZINE HYDROCHLORIDE 25 MG/1
25 TABLET, FILM COATED ORAL
Qty: 60 TABLET | Refills: 2 | Status: SHIPPED | OUTPATIENT
Start: 2025-01-23

## 2025-01-23 RX ORDER — ATORVASTATIN CALCIUM 10 MG/1
5 TABLET, FILM COATED ORAL NIGHTLY
COMMUNITY
End: 2025-01-23 | Stop reason: SDUPTHER

## 2025-01-23 ASSESSMENT — PATIENT HEALTH QUESTIONNAIRE - PHQ9: CLINICAL INTERPRETATION OF PHQ2 SCORE: 0

## 2025-01-23 NOTE — PROGRESS NOTES
Virtual Visit: Established Patient   This visit was conducted via Teams using secure and encrypted videoconferencing technology.   The patient was in their home in the [facility].    The patient's identity was confirmed and verbal consent was obtained for this virtual visit.    Subjective:   CC:   Chief Complaint   Patient presents with    Other     Sleep study results      Eric Ahn is a 44 y.o. male presenting for evaluation and management of:    History of Present Illness  The patient presents via virtual visit for evaluation of insomnia, anxiety, and hypertension.    He underwent a sleep study in 05/2024 but has not yet been referred to a sleep medicine specialist. He has experienced severe insomnia for the past few weeks, attributing it to a recent illness with significant congestion. His anxiety has increased, especially in small, enclosed spaces like his workplace, and when trying to fall asleep, fearing he will not wake up. He wakes up during the night, possibly due to sleep apnea, and leaves the television on while sleeping. He finds it easier to sleep at home when his wife is present. He has been taking 5 mg of melatonin and is unsure about refills. He plans to bring his lab results to his next appointment. Hydroxyzine has been effective for sleep, but his provider was hesitant to prescribe it due to sleep study results. Benadryl and melatonin have not been consistently effective.    He has been monitoring his blood pressure and found the prescribed 25 mg dose of losartan ineffective, so he increased it to 50 mg. He does not require a refill at this time.    Supplemental Information  His voice has not fully recovered, which he attributes to lack of sleep. He continues to experience a cough but feels his lung function is at approximately 80 percent and his breathing is clear. He does not report any current congestion.    MEDICATIONS  Current: losartan, melatonin, hydroxyzine,  "Benadryl        ROS   Per hpi    Current medicines (including changes today)  Current Outpatient Medications   Medication Sig Dispense Refill    losartan (COZAAR) 25 MG Tab Take 25 mg by mouth every day.      hydrOXYzine HCl (ATARAX) 25 MG Tab Take 1 Tablet by mouth at bedtime as needed (Insomnia). 60 Tablet 2    atorvastatin (LIPITOR) 10 MG Tab Take 0.5 Tablets by mouth every evening. 60 Tablet 2     No current facility-administered medications for this visit.       Patient Active Problem List    Diagnosis Date Noted    Primary hypertension 03/01/2024    Insomnia 03/01/2024    Suspected sleep apnea 03/01/2024        Objective:   Ht 1.727 m (5' 8\")   Wt 99.8 kg (220 lb)   BMI 33.45 kg/m²     Physical Exam:  Constitutional: Alert, no distress, well-groomed.  Skin: No rashes in visible areas.  Eye: Round. Conjunctiva clear, lids normal. No icterus.   ENMT: Lips pink without lesions, good dentition, moist mucous membranes. Phonation normal.  Neck: No masses, no thyromegaly. Moves freely without pain.  Respiratory: Unlabored respiratory effort, no cough or audible wheeze  Psych: Alert and oriented x3, normal affect and mood.     Assessment and Plan:   The following treatment plan was discussed:     Assessment & Plan  1. Insomnia:  2 KOTA   - Referral to sleep medicine for further evaluation and potential CPAP therapy initiation.  - Advised to take hydroxyzine 25 mg as needed for sleep.    3 Anxiety: Chronic.  - Managed with hydroxyzine 25 mg as needed.  - Potential side effects, including drowsiness, discussed.    4. Hypertension: Chronic.  - Advised to continue 50 mg dose of losartan.  - Prescription refill for losartan provided.  - Laboratory tests, including a cholesterol panel, ordered.  - Instructed to fast before the cholesterol test.    Follow-up  - Follow-up appointment to be scheduled after lab results.        Problem List Items Addressed This Visit       Primary hypertension    Relevant Medications    " losartan (COZAAR) 25 MG Tab    atorvastatin (LIPITOR) 10 MG Tab    Other Relevant Orders    Comp Metabolic Panel    Insomnia    Relevant Medications    hydrOXYzine HCl (ATARAX) 25 MG Tab     Other Visit Diagnoses       Severe sleep apnea        Relevant Orders    Referral to Pulmonary and Sleep Medicine    CBC WITHOUT DIFFERENTIAL    Pure hypercholesterolemia        Relevant Medications    losartan (COZAAR) 25 MG Tab    atorvastatin (LIPITOR) 10 MG Tab    Other Relevant Orders    Lipid Profile    BMI 33.0-33.9,adult        Relevant Orders    TSH WITH REFLEX TO FT4              Follow-up: Return in about 3 months (around 4/23/2025), or if symptoms worsen or fail to improve.

## 2025-01-27 ENCOUNTER — HOSPITAL ENCOUNTER (OUTPATIENT)
Dept: LAB | Facility: MEDICAL CENTER | Age: 45
End: 2025-01-27
Attending: STUDENT IN AN ORGANIZED HEALTH CARE EDUCATION/TRAINING PROGRAM
Payer: COMMERCIAL

## 2025-01-27 DIAGNOSIS — G47.30 SEVERE SLEEP APNEA: ICD-10-CM

## 2025-01-27 DIAGNOSIS — I10 PRIMARY HYPERTENSION: ICD-10-CM

## 2025-01-27 DIAGNOSIS — E78.00 PURE HYPERCHOLESTEROLEMIA: ICD-10-CM

## 2025-01-27 LAB
ALBUMIN SERPL BCP-MCNC: 4.5 G/DL (ref 3.2–4.9)
ALBUMIN/GLOB SERPL: 1.6 G/DL
ALP SERPL-CCNC: 46 U/L (ref 30–99)
ALT SERPL-CCNC: 64 U/L (ref 2–50)
ANION GAP SERPL CALC-SCNC: 12 MMOL/L (ref 7–16)
AST SERPL-CCNC: 41 U/L (ref 12–45)
BILIRUB SERPL-MCNC: 0.6 MG/DL (ref 0.1–1.5)
BUN SERPL-MCNC: 14 MG/DL (ref 8–22)
CALCIUM ALBUM COR SERPL-MCNC: 9.2 MG/DL (ref 8.5–10.5)
CALCIUM SERPL-MCNC: 9.6 MG/DL (ref 8.5–10.5)
CHLORIDE SERPL-SCNC: 102 MMOL/L (ref 96–112)
CHOLEST SERPL-MCNC: 237 MG/DL (ref 100–199)
CO2 SERPL-SCNC: 25 MMOL/L (ref 20–33)
CREAT SERPL-MCNC: 1.02 MG/DL (ref 0.5–1.4)
ERYTHROCYTE [DISTWIDTH] IN BLOOD BY AUTOMATED COUNT: 43.2 FL (ref 35.9–50)
FASTING STATUS PATIENT QL REPORTED: NORMAL
GFR SERPLBLD CREATININE-BSD FMLA CKD-EPI: 92 ML/MIN/1.73 M 2
GLOBULIN SER CALC-MCNC: 2.9 G/DL (ref 1.9–3.5)
GLUCOSE SERPL-MCNC: 105 MG/DL (ref 65–99)
HCT VFR BLD AUTO: 48.8 % (ref 42–52)
HDLC SERPL-MCNC: 38 MG/DL
HGB BLD-MCNC: 16.2 G/DL (ref 14–18)
LDLC SERPL CALC-MCNC: 144 MG/DL
MCH RBC QN AUTO: 30.6 PG (ref 27–33)
MCHC RBC AUTO-ENTMCNC: 33.2 G/DL (ref 32.3–36.5)
MCV RBC AUTO: 92.1 FL (ref 81.4–97.8)
PLATELET # BLD AUTO: 214 K/UL (ref 164–446)
PMV BLD AUTO: 12.1 FL (ref 9–12.9)
POTASSIUM SERPL-SCNC: 4.3 MMOL/L (ref 3.6–5.5)
PROT SERPL-MCNC: 7.4 G/DL (ref 6–8.2)
RBC # BLD AUTO: 5.3 M/UL (ref 4.7–6.1)
SODIUM SERPL-SCNC: 139 MMOL/L (ref 135–145)
TRIGL SERPL-MCNC: 275 MG/DL (ref 0–149)
TSH SERPL DL<=0.005 MIU/L-ACNC: 1.67 UIU/ML (ref 0.38–5.33)
WBC # BLD AUTO: 9.3 K/UL (ref 4.8–10.8)

## 2025-01-27 PROCEDURE — 80061 LIPID PANEL: CPT

## 2025-01-27 PROCEDURE — 80053 COMPREHEN METABOLIC PANEL: CPT

## 2025-01-27 PROCEDURE — 84443 ASSAY THYROID STIM HORMONE: CPT

## 2025-01-27 PROCEDURE — 36415 COLL VENOUS BLD VENIPUNCTURE: CPT

## 2025-01-27 PROCEDURE — 85027 COMPLETE CBC AUTOMATED: CPT
